# Patient Record
Sex: FEMALE | Race: BLACK OR AFRICAN AMERICAN | Employment: UNEMPLOYED | URBAN - METROPOLITAN AREA
[De-identification: names, ages, dates, MRNs, and addresses within clinical notes are randomized per-mention and may not be internally consistent; named-entity substitution may affect disease eponyms.]

---

## 2017-01-01 ENCOUNTER — APPOINTMENT (OUTPATIENT)
Dept: MRI IMAGING | Age: 42
DRG: 974 | End: 2017-01-01
Attending: INTERNAL MEDICINE
Payer: COMMERCIAL

## 2017-01-01 ENCOUNTER — HOSPICE ADMISSION (OUTPATIENT)
Dept: HOSPICE | Facility: HOSPICE | Age: 42
End: 2017-01-01
Payer: COMMERCIAL

## 2017-01-01 ENCOUNTER — APPOINTMENT (OUTPATIENT)
Dept: GENERAL RADIOLOGY | Age: 42
DRG: 974 | End: 2017-01-01
Attending: INTERNAL MEDICINE
Payer: COMMERCIAL

## 2017-01-01 ENCOUNTER — HOME CARE VISIT (OUTPATIENT)
Dept: HOSPICE | Facility: HOSPICE | Age: 42
End: 2017-01-01
Payer: COMMERCIAL

## 2017-01-01 ENCOUNTER — APPOINTMENT (OUTPATIENT)
Dept: INTERVENTIONAL RADIOLOGY/VASCULAR | Age: 42
DRG: 974 | End: 2017-01-01
Attending: HOSPITALIST
Payer: COMMERCIAL

## 2017-01-01 ENCOUNTER — APPOINTMENT (OUTPATIENT)
Dept: GENERAL RADIOLOGY | Age: 42
DRG: 974 | End: 2017-01-01
Attending: PSYCHIATRY & NEUROLOGY
Payer: COMMERCIAL

## 2017-01-01 ENCOUNTER — HOSPITAL ENCOUNTER (INPATIENT)
Age: 42
LOS: 4 days | DRG: 974 | End: 2017-01-18
Attending: INTERNAL MEDICINE | Admitting: FAMILY MEDICINE
Payer: OTHER MISCELLANEOUS

## 2017-01-01 VITALS
HEART RATE: 89 BPM | TEMPERATURE: 97.4 F | OXYGEN SATURATION: 99 % | RESPIRATION RATE: 30 BRPM | SYSTOLIC BLOOD PRESSURE: 90 MMHG | DIASTOLIC BLOOD PRESSURE: 61 MMHG

## 2017-01-01 PROCEDURE — 0651 HSPC ROUTINE HOME CARE

## 2017-01-01 PROCEDURE — 74011000258 HC RX REV CODE- 258: Performed by: FAMILY MEDICINE

## 2017-01-01 PROCEDURE — 0656 HSPC GENERAL INPATIENT

## 2017-01-01 PROCEDURE — 74011250636 HC RX REV CODE- 250/636: Performed by: FAMILY MEDICINE

## 2017-01-01 PROCEDURE — 71010 XR CHEST PORT: CPT

## 2017-01-01 PROCEDURE — G0299 HHS/HOSPICE OF RN EA 15 MIN: HCPCS

## 2017-01-01 PROCEDURE — 70553 MRI BRAIN STEM W/O & W/DYE: CPT

## 2017-01-01 PROCEDURE — 77003 FLUOROGUIDE FOR SPINE INJECT: CPT

## 2017-01-01 PROCEDURE — 3336500001 HSPC ELECTION

## 2017-01-01 PROCEDURE — 74011000250 HC RX REV CODE- 250: Performed by: FAMILY MEDICINE

## 2017-01-01 PROCEDURE — 74011250637 HC RX REV CODE- 250/637: Performed by: FAMILY MEDICINE

## 2017-01-01 RX ORDER — LORAZEPAM 2 MG/ML
1 INJECTION INTRAMUSCULAR
Status: DISCONTINUED | OUTPATIENT
Start: 2017-01-01 | End: 2017-01-19 | Stop reason: HOSPADM

## 2017-01-01 RX ORDER — GLYCOPYRROLATE 0.2 MG/ML
0.2 INJECTION INTRAMUSCULAR; INTRAVENOUS
Status: DISCONTINUED | OUTPATIENT
Start: 2017-01-01 | End: 2017-01-19 | Stop reason: HOSPADM

## 2017-01-01 RX ORDER — HYDROMORPHONE HCL IN 0.9% NACL 15 MG/30ML
PATIENT CONTROLLED ANALGESIA VIAL INTRAVENOUS CONTINUOUS
Status: DISCONTINUED | OUTPATIENT
Start: 2017-01-01 | End: 2017-01-19 | Stop reason: HOSPADM

## 2017-01-01 RX ORDER — HYDROMORPHONE HYDROCHLORIDE 1 MG/ML
1 INJECTION, SOLUTION INTRAMUSCULAR; INTRAVENOUS; SUBCUTANEOUS
Status: DISCONTINUED | OUTPATIENT
Start: 2017-01-01 | End: 2017-01-19 | Stop reason: HOSPADM

## 2017-01-01 RX ORDER — ONDANSETRON 2 MG/ML
4 INJECTION INTRAMUSCULAR; INTRAVENOUS
Status: DISCONTINUED | OUTPATIENT
Start: 2017-01-01 | End: 2017-01-19 | Stop reason: HOSPADM

## 2017-01-01 RX ORDER — KETOROLAC TROMETHAMINE 30 MG/ML
30 INJECTION, SOLUTION INTRAMUSCULAR; INTRAVENOUS
Status: ACTIVE | OUTPATIENT
Start: 2017-01-01 | End: 2017-01-01

## 2017-01-01 RX ORDER — SODIUM CHLORIDE 0.9 % (FLUSH) 0.9 %
5 SYRINGE (ML) INJECTION AS NEEDED
Status: DISCONTINUED | OUTPATIENT
Start: 2017-01-01 | End: 2017-01-19 | Stop reason: HOSPADM

## 2017-01-01 RX ORDER — CHLORHEXIDINE GLUCONATE 1.2 MG/ML
15 RINSE ORAL 2 TIMES DAILY
Status: DISCONTINUED | OUTPATIENT
Start: 2017-01-01 | End: 2017-01-19 | Stop reason: HOSPADM

## 2017-01-01 RX ORDER — SCOLOPAMINE TRANSDERMAL SYSTEM 1 MG/1
1.5 PATCH, EXTENDED RELEASE TRANSDERMAL
Status: DISCONTINUED | OUTPATIENT
Start: 2017-01-01 | End: 2017-01-19 | Stop reason: HOSPADM

## 2017-01-01 RX ORDER — LORAZEPAM 2 MG/ML
2 INJECTION INTRAMUSCULAR EVERY 4 HOURS
Status: DISCONTINUED | OUTPATIENT
Start: 2017-01-01 | End: 2017-01-19 | Stop reason: HOSPADM

## 2017-01-01 RX ORDER — FENTANYL CITRATE-0.9 % NACL/PF 900MCG/30
PATIENT CONTROLLED ANALGESIA VIAL INJECTION CONTINUOUS
Status: DISCONTINUED | OUTPATIENT
Start: 2017-01-01 | End: 2017-01-01

## 2017-01-01 RX ORDER — FACIAL-BODY WIPES
10 EACH TOPICAL DAILY PRN
Status: DISCONTINUED | OUTPATIENT
Start: 2017-01-01 | End: 2017-01-19 | Stop reason: HOSPADM

## 2017-01-01 RX ADMIN — Medication: at 09:00

## 2017-01-01 RX ADMIN — Medication: at 10:10

## 2017-01-01 RX ADMIN — LORAZEPAM 2 MG: 2 INJECTION INTRAMUSCULAR; INTRAVENOUS at 05:41

## 2017-01-01 RX ADMIN — Medication: at 02:01

## 2017-01-01 RX ADMIN — LORAZEPAM 1 MG: 2 INJECTION INTRAMUSCULAR; INTRAVENOUS at 08:17

## 2017-01-01 RX ADMIN — LORAZEPAM 2 MG: 2 INJECTION INTRAMUSCULAR; INTRAVENOUS at 04:44

## 2017-01-01 RX ADMIN — LORAZEPAM 1 MG: 2 INJECTION INTRAMUSCULAR; INTRAVENOUS at 08:59

## 2017-01-01 RX ADMIN — LEVETIRACETAM 2000 MG: 100 INJECTION, SOLUTION INTRAVENOUS at 21:42

## 2017-01-01 RX ADMIN — Medication 5 ML: at 04:44

## 2017-01-01 RX ADMIN — GLYCOPYRROLATE 0.2 MG: 0.2 INJECTION INTRAMUSCULAR; INTRAVENOUS at 22:37

## 2017-01-01 RX ADMIN — LORAZEPAM 1 MG: 2 INJECTION INTRAMUSCULAR; INTRAVENOUS at 10:21

## 2017-01-01 RX ADMIN — CHLORHEXIDINE GLUCONATE 15 ML: 1.2 RINSE ORAL at 21:42

## 2017-01-01 RX ADMIN — LORAZEPAM 1 MG: 2 INJECTION INTRAMUSCULAR; INTRAVENOUS at 11:25

## 2017-01-01 RX ADMIN — CHLORHEXIDINE GLUCONATE 15 ML: 1.2 RINSE ORAL at 18:21

## 2017-01-01 RX ADMIN — LORAZEPAM 2 MG: 2 INJECTION INTRAMUSCULAR; INTRAVENOUS at 15:31

## 2017-01-01 RX ADMIN — LORAZEPAM 2 MG: 2 INJECTION INTRAMUSCULAR; INTRAVENOUS at 13:40

## 2017-01-01 RX ADMIN — LORAZEPAM 2 MG: 2 INJECTION INTRAMUSCULAR; INTRAVENOUS at 01:11

## 2017-01-01 RX ADMIN — LORAZEPAM 2 MG: 2 INJECTION INTRAMUSCULAR; INTRAVENOUS at 16:18

## 2017-01-01 RX ADMIN — CHLORHEXIDINE GLUCONATE 15 ML: 1.2 RINSE ORAL at 19:54

## 2017-01-01 RX ADMIN — LEVETIRACETAM 2000 MG: 100 INJECTION, SOLUTION INTRAVENOUS at 07:47

## 2017-01-01 RX ADMIN — LORAZEPAM 2 MG: 2 INJECTION INTRAMUSCULAR; INTRAVENOUS at 09:03

## 2017-01-01 RX ADMIN — LORAZEPAM 2 MG: 2 INJECTION INTRAMUSCULAR; INTRAVENOUS at 07:25

## 2017-01-01 RX ADMIN — Medication: at 15:38

## 2017-01-01 RX ADMIN — Medication 5 ML: at 21:48

## 2017-01-01 RX ADMIN — LORAZEPAM 2 MG: 2 INJECTION INTRAMUSCULAR; INTRAVENOUS at 21:40

## 2017-01-01 RX ADMIN — CHLORHEXIDINE GLUCONATE 15 ML: 1.2 RINSE ORAL at 09:03

## 2017-01-01 RX ADMIN — LEVETIRACETAM 2000 MG: 100 INJECTION, SOLUTION INTRAVENOUS at 22:00

## 2017-01-01 RX ADMIN — CHLORHEXIDINE GLUCONATE 15 ML: 1.2 RINSE ORAL at 08:43

## 2017-01-01 RX ADMIN — Medication: at 17:33

## 2017-01-01 RX ADMIN — LEVETIRACETAM 2000 MG: 100 INJECTION, SOLUTION INTRAVENOUS at 21:39

## 2017-01-01 RX ADMIN — CHLORHEXIDINE GLUCONATE 15 ML: 1.2 RINSE ORAL at 09:04

## 2017-01-01 RX ADMIN — Medication: at 00:04

## 2017-01-01 RX ADMIN — LORAZEPAM 2 MG: 2 INJECTION INTRAMUSCULAR; INTRAVENOUS at 02:00

## 2017-01-01 RX ADMIN — Medication 5 ML: at 12:16

## 2017-01-01 RX ADMIN — Medication: at 15:45

## 2017-01-01 RX ADMIN — Medication: at 08:08

## 2017-01-01 RX ADMIN — Medication: at 12:02

## 2017-01-01 RX ADMIN — LORAZEPAM 2 MG: 2 INJECTION INTRAMUSCULAR; INTRAVENOUS at 12:16

## 2017-01-01 RX ADMIN — LORAZEPAM 2 MG: 2 INJECTION INTRAMUSCULAR; INTRAVENOUS at 22:15

## 2017-01-01 RX ADMIN — CHLORHEXIDINE GLUCONATE 15 ML: 1.2 RINSE ORAL at 22:14

## 2017-01-01 RX ADMIN — Medication: at 03:15

## 2017-01-01 RX ADMIN — Medication 5 ML: at 01:00

## 2017-01-01 RX ADMIN — LEVETIRACETAM 2000 MG: 100 INJECTION, SOLUTION INTRAVENOUS at 08:40

## 2017-01-01 RX ADMIN — LORAZEPAM 2 MG: 2 INJECTION INTRAMUSCULAR; INTRAVENOUS at 01:00

## 2017-01-01 RX ADMIN — LEVETIRACETAM 2000 MG: 100 INJECTION, SOLUTION INTRAVENOUS at 22:30

## 2017-01-01 RX ADMIN — LEVETIRACETAM 2000 MG: 100 INJECTION, SOLUTION INTRAVENOUS at 09:10

## 2017-01-01 RX ADMIN — LORAZEPAM 2 MG: 2 INJECTION INTRAMUSCULAR; INTRAVENOUS at 11:28

## 2017-01-01 RX ADMIN — LEVETIRACETAM 2000 MG: 100 INJECTION, SOLUTION INTRAVENOUS at 08:44

## 2017-01-01 RX ADMIN — LORAZEPAM 2 MG: 2 INJECTION INTRAMUSCULAR; INTRAVENOUS at 07:19

## 2017-01-01 RX ADMIN — LORAZEPAM 2 MG: 2 INJECTION INTRAMUSCULAR; INTRAVENOUS at 17:06

## 2017-01-01 RX ADMIN — Medication: at 22:35

## 2017-01-01 RX ADMIN — LORAZEPAM 2 MG: 2 INJECTION INTRAMUSCULAR; INTRAVENOUS at 11:17

## 2017-01-01 RX ADMIN — CHLORHEXIDINE GLUCONATE 15 ML: 1.2 RINSE ORAL at 08:41

## 2017-01-01 RX ADMIN — LORAZEPAM 2 MG: 2 INJECTION INTRAMUSCULAR; INTRAVENOUS at 19:54

## 2017-01-04 PROBLEM — R90.89 ABNORMAL BRAIN MRI: Status: ACTIVE | Noted: 2017-01-01

## 2017-01-07 PROBLEM — B20 AIDS (ACQUIRED IMMUNE DEFICIENCY SYNDROME) (HCC): Status: ACTIVE | Noted: 2017-01-01

## 2017-01-10 PROBLEM — R40.1 OBTUNDED: Status: ACTIVE | Noted: 2017-01-01

## 2017-01-11 PROBLEM — R40.20 COMA (HCC): Status: ACTIVE | Noted: 2017-01-01

## 2017-01-14 PROBLEM — B20 AIDS DUE TO HIV-I (HCC): Status: ACTIVE | Noted: 2017-01-01

## 2017-01-15 NOTE — HOSPICE
[facility] Worker Note:    LCSW attempted to meet with pts family who were at bedside to provide support and complete initial psychosocial assessment. Pts family was sleeping and Hospital RN stated pt was more comfortable now, as she was having seizures earlier. LCSW did not want to wake family due to appearing to be exhausted. LCSW encouraged hospital staff to call with any support needs. Hospital staff stated pts family appeared to be doing ok at this time. Hospice SW will follow-up with pts family to complete assessment.      Jenae [id number]   [phone]

## 2017-01-15 NOTE — PROGRESS NOTES
01/14/17 2134   Vital Signs   Temp 97.7 °F (36.5 °C)   Temp Source Axillary   Pulse (Heart Rate) 94   Heart Rate Source Monitor   Resp Rate 16   O2 Sat (%) 96 %   Level of Consciousness (!) Responds to Pain   /65   MAP (Calculated) 77   BP 1 Method Automatic   BP 1 Location Left arm   BP Patient Position At rest   MEWS Score 4   Pain 1   Pain Scale 1 Adult Nonverbal Pain Scale   Adult Nonverbal Pain Scale   Face 0   Activity (Movement) 0   Guarding 0   Physiology (Vital Signs) 0   Respiratory 0   Total Score 0   Oxygen Therapy   O2 Device Nasal cannula   O2 Flow Rate (L/min) 4 l/min   Patient Observation   Repositioned Head of bed elevated (degrees)   Patient Turned Turn on left side   Observations in bed; family at bedside   Hourly Rounds Yes   Mews 4. Pt on Hospice care.  Will continue to monitor

## 2017-01-15 NOTE — HOSPICE
[facility]  [phone]    GIP Daily Nursing Note   Patient Name: Guille Stephenson  YOB: 1975  Age: 42 y.o. Date of Visit: 01/15/17  Facility of Care: 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 Seizures, intractable-seizing when I am in the room  2. SOB  3. Agitation  4. Respiratory failure  5. Secretions    ASSESSMENT & PLAN   Must update Plan of Care including visit frequencies for IDT members  1. Seizures, intractable: Witnessed seizure activity by Dr. Luis Alberto Corral early today. Scheduled Ativan 2mg  IV every 4 hours added to medication regimen. Continue IV Keppra. 2. SOB: Dr. Luis Alberto Corral changed to Dilaudid PCA 1 mg/hour basal with 0.5 mg bolus every 10 minutes. 3. Agitation- stabilized since receiving scheduled Ativan  4. Respiratory failure- respirations shallow, agonal  5. Secretions- Robinul prn, Scopolomine patch   Spiritual Interventions:     Psych/ Social/ Emotional Interventions:  Family not present at time of assessment    Care Coordination Needs: Discussed with primary RN, Dr. Luis Alberto Corral, and IDT    Care plan and New Orders discussed / approved with Luis Alberto Corral MD.    Description History and Chart Review   If this is initial GIP note must document RN assessment/MD communication in previous setting. Specifically document nursing/medication needs in last 24 hours to support GIP care  Narrative History of last 24 hours that demonstrates care cannot be provided in another setting:  Patient is a 41 YO  female who  was hospitalized with complications related to her AIDs diagnosis to include pneumocystis pneumonia, brain lesions which may be related lymphoma vs infection causing seizures, CMV viremia, anemia, viral load >800,000.  Patient continued to decline and ultimately family shifted focus toward comfort and was extubated. Patient was admitted to inpatient hospice, [facility] level of care on 1/14/2016. Patient has had seizure activity despite being on IV Kepra. She remains unresponsive, breathing more labored, agonal. No seizure activity noted at this time    What has been done to control the patient's symptoms in the last 24 hours? Dr. Karon Chang discontinued Fentynal PCA due to concern of dyspnea and changed to Dilaudid PCA. Ativan scheduled every 4 hours. Robinul scheduled    Does the patient currently require IV medications? Yes  Does the patient currently require scheduled medications? yes  Does the patient currently require a PCA? Yes    List number of doses of PRN medications in last 24 hours:  Medication 1:  Number of doses:    Medication 2:   Number of doses:    Medication 3:   Number of doses:    Supporting documentation for GIP need for pain control:  [x] Frequent evaluation by a doctor, nurse practitioner, nurse   [x] Frequent medication adjustment    [x] IVs that cannot be administered at home   [x] Aggressive pain management   [x] Complicated technical delivery of medications              Supporting documentation for GIP need for symptom control:  [x]  Sudden decline necessitating intensive nursing intervention  []  Uncontrolled / intractable nausea or vomiting   []  Pathological fractures  []  Advanced open wounds requiring frequent skilled care  [] Unmanageable respiratory distress  [] New or worsening delirium   [] Delirium with behavior issues: Is 24 hour caregiver present due to safety concerns with agitation? (yes/no)  [x] Imminent death  with skilled nursing needs documented above    DISCHARGE PLANNING   Daily discharge planning required for GIP  1. Discharge Plan: Patient will most likely die in the hospital. Plan would be nursing home admission with hospice  2. Patient/Family teaching: No family present  3.  Response to patient/family teaching:     ASSESSMENT    KARNOFSKY: 10    Prognosis estimated based on 01/15/17 clinical assessment is:   [] Few to Many Hours  [] Hours to Days   [] Few to Many Days   [] Days to Weeks   [] Few to Many Weeks   [] Weeks to Months   [] Few to Many Months    Quality Measure: Patient self-reports:  [] Yes    [] No    ESAS:   Time of Assessment: 1800  Pain (1-10): 4  Fatigue (1-10): 10  Shortness of breath (1-10):5  Nausea (1-10): 0  Appetite (1-10):    Anxiety: (1-10):   Depression: (1-10):   Well-being: (1-10):   Constipation: _ Yes  _ No  LAST BM:     CLINICAL INFORMATION   Patient Vitals for the past 12 hrs:   Temp Pulse Resp BP SpO2   01/15/17 0904 98.3 °F (36.8 °C) (!) 111 24 104/67 90 %       Currently this patient has:  [] Supplemental O2   [x] IV    [] PICC      [] PORT   [] NG Tube    [] PEG Tube   [] Ostomy     [x] Alfaro draining  ambur urine  [] Other:     SIGNS/PHYSICAL FINDINGS     Skin (including wound):  [x] Warm, dry, supple, intact and color normal for race  [] Warm   [] Dry   [] Cool     [] Clammy       [] Diaphoretic    Turgor   [] Normal   [x] Decreased  Color:   [] Pink   [] Pale   [] Cyanotic   [] Erythema   [] Jaundice   [x] Normal for Race  []  Wounds:    Neuro:  [x] Lethargy  [] Restlessness / agitation  [] Confusion / delirium  [] Hallucinations  [] Responds to maximal stimulation  [x] Unresponsive  [x] Seizures     Cardiac:  [] Dyspnea on Exertion  [] JVD  [] Murmur  [] Palpitations  [] Hypotension  [] Hypertension  [x] Tachycardia  [] Bradycardia  [] Irregular HR  [] Pulses Decreased  [] Pulses Absent  [] Edema:       (Location, Grade and Pitting)  [] Mottling:      (Location)    Respiratory:  Breath sounds:    [x] Diminished   [] Wheeze   [x] Rhonchi   [] Rales   [] Even and unlabored  [x] Labored: shallow, agonal'  [] Cough   [] Non Productive   [] Productive    [] Description:           [] Deep suctioned   [] O2 at ___ LPM  [] High flow oxygen greater than 10 LPM  [] Bi-Pap    GI  [] Abdomen (describe)   [] Ascites  [] Nausea  [] Vomiting  [] Incontinent of bowels  [x] Bowel sounds yes  [] Diarrhea  [] Constipation (see above including last bowel movement)  [] Checked for impaction  [x] Last BM 1/3/17    Nutrition  Diet: NPO  Appetite:   [] Good   [] Fair   [] Poor   [] Tube Feeding       [] Voiding  [] Incontinent   [x] Alfaro    Musculoskeletal  [] Balance/[place] Unsteady   [] Weak   Strength:    [] Normal    [] Limited    [x] Decreasing   Activities:    [] Up as tolerated   [x] Bedridden    [] Specify:    SAFETY  [] 24 hr. Caregiver   [x] Side rails ?     [x] Hospital bed   [] Reviewed Falls & Safety       ALLERGIES AND MEDICATIONS     Allergies: No Known Allergies    Current Facility-Administered Medications   Medication Dose Route Frequency    HYDROmorphone (PF) 15 mg/30 ml (DILAUDID) PCA   IntraVENous CONTINUOUS    LORazepam (ATIVAN) injection 2 mg  2 mg IntraVENous Q4H    ketorolac (TORADOL) injection 30 mg  30 mg IntraVENous Q8H PRN    scopolamine (TRANSDERM-SCOP) 1.5 mg  1.5 mg TransDERmal Q72H PRN    glycopyrrolate (ROBINUL) injection 0.2 mg  0.2 mg IntraVENous Q4H PRN    bisacodyl (DULCOLAX) suppository 10 mg  10 mg Rectal DAILY PRN    levETIRAcetam (KEPPRA) 2,000 mg in 0.9% sodium chloride IVPB  2,000 mg IntraVENous Q12H    ondansetron (ZOFRAN) injection 4 mg  4 mg IntraVENous Q4H PRN    LORazepam (ATIVAN) injection 1 mg  1 mg IntraVENous Q15MIN PRN    saline peripheral flush soln 5 mL  5 mL InterCATHeter PRN    chlorhexidine (PERIDEX) 0.12 % mouthwash 15 mL  15 mL Oral BID          Visit Time In: 5257  Visit Time Out: 1800

## 2017-01-15 NOTE — HOSPICE
[facility]  [phone]    Inpatient Nursing Admission   Patient Name: Katja Farrell  YOB: 1975  Age: 42 y.o. Date of Hospice Admission: (Not on file)  Hospice Attending: Genie Miranda MD  Primary Care Physician: None  Admitting RN: Rodney Garza  :     Level of Care (GIP/Routine/Respite): GIP  Facility of Care: [facility]  Patient Room: 619/01     HOSPICE SUMMARY   ER Visits/ Hospitalizations in past year:   Hospice Diagnosis: AIDS  Onset Date of Hospice Diagnosis: 2009  Co-Morbidities:   Patient Active Problem List   Diagnosis Code    Pneumonia J18.9    Multifocal pneumonia J18.9    Abnormal brain MRI R90.89    AIDS (acquired immune deficiency syndrome) ([address] B20    Coma ([address] R40.20     Diagnoses RELATED to the terminal prognosis:  Encephalopathy with brain lesions with mass effect. , seizures, acute respiratory failure, pneumonia, septic shock  Other Diagnoses:     Rationale for a prognosis of life expectancy of 6 months or less if the disease follows its normal course (Disease Specific History):   Ms. Rylan Friedman is a 42 y.o.  female with diagnosis if HIV/AIDSwho was  admitted to [facility] on 12/27/2016 with  Fever, chills and weight loss for greater than one week. CXR revealed multifocal pneumonia and systolic cardiomyopathy. MRI revealed encephalopathy with brain lesions with mass effect. Patient was referred to Palliative Medicine  compassionately extubated on 1/12/17. Family has requested comfort measures and referred to hospice. The patient's principle diagnosis of AIDS has resulted in bilateral pneuemonia, respiratory failure, encephalopathy, seizures, severe weight loss.   Functionally, the patient's Palliative Performance Scale has declined over a period of months  and is estimated at 10  Objective information that support this patients limited prognosis includes: sepsis, respiratory failure, unresponsive to verbal or pain stimuli . The patient/family chose comfort measures with the support of Hospice. Prognosis estimated based on 01/14/17 clinical assessment is:   [x] Few to Many Hours  [] Hours to Days   [] Few to Many Days   [] Days to Weeks   [] Few to Many Weeks   [] Weeks to Months   [] Few to Many Months    ADVANCE CARE PLANNING (Complete in ACP Flow Sheet)   Code Status: DNR   Durable DNR: N Yes  _ No  Advance Care Planning 12/28/2016   Patient's Healthcare Decision Maker is: Named in scanned ACP document   Primary Decision Maker Name Toyin Alvares   Primary Decision Maker Phone Number [phone]   Primary Decision Maker Relationship to Patient Parent   Confirm Advance Directive Yes, on file        Service: [] Yes  []  No      [] Unknown  Appropriate for Pinning Ceremony:  [] Yes     [] No  Confucianist: Restorationist    ASSESSMENT    Quality Measure: Patient self-reports:  []  Yes    [x] No    SYMPTOMS: Fever 102.6, agitation, restlessness. unresponsive  ESAS:   Time of Assessment: 2000  Pain (1-10): 5   Shortness of breath (1-10): 2  Nausea (1-10): 0  Constipation: []  Yes [] No    FALL RISK:  [] Low   [] Moderate    [] High    [x] Not able to assess    KARNOFSKY: 10    PRESSURE ULCERS   Bob Scale (pressure ulcer risk): low    FAST for all dementia:     Progression to DEPENDENCE WITH ADLs (include time frame): weeks  [x] Dependent for bathing: personal hygiene and grooming   [x] Dependent for dressing: dressing and undressing   [x] Dependent for transferring: movement and mobility   [x] Dependent for toileting: continence-related tasks including control and hygiene   [x] Dependent for eating: preparing food and feeding    CLINICAL INFORMATION   Mid-arm Circumference (MAC):        Wt Readings from Last 3 Encounters:   01/13/17 51.3 kg (113 lb)   01/03/17 52.8 kg (116 lb 6.5 oz)   12/27/16 39 kg (85 lb 15.7 oz)     There is no height or weight on file to calculate BMI.       Currently this patient has:  [] Supplemental O2 [x] Peripheral IV  [] PICC    [] PORT   [x] Alfaro Catheter [] NG Tube   [] PEG Tube [] Ostomy    [] AICD: Has ICD been deactivated? [] Yes [] No:______    Medications 01/12/17 01/13/17 01/14/17         Current Facility-Administered Medications   Medication Dose Route Frequency    ketorolac (TORADOL) injection 30 mg  30 mg IntraVENous Q8H PRN    scopolamine (TRANSDERM-SCOP) 1.5 mg  1.5 mg TransDERmal Q72H PRN    glycopyrrolate (ROBINUL) injection 0.2 mg  0.2 mg IntraVENous Q4H PRN    bisacodyl (DULCOLAX) suppository 10 mg  10 mg Rectal DAILY PRN    levETIRAcetam (KEPPRA) 2,000 mg in 0.9% sodium chloride IVPB  2,000 mg IntraVENous Q12H    ondansetron (ZOFRAN) injection 4 mg  4 mg IntraVENous Q4H PRN    LORazepam (ATIVAN) injection 1 mg  1 mg IntraVENous Q15MIN PRN    fentaNYL (PF)  mcg/30 ml (ADULT)   IntraVENous CONTINUOUS    saline peripheral flush soln 5 mL  5 mL InterCATHeter PRN    chlorhexidine (PERIDEX) 0.12 % mouthwash 15 mL  15 mL Oral BID       LAB VALUES  No results found for this visit on 01/14/17 (from the past 12 hour(s)). No results found for this visit on 01/14/17 (from the past 6 hour(s)). Lab Results   Component Value Date/Time    Protein, total 5.4 01/12/2017 03:06 AM    Albumin 1.7 01/12/2017 03:06 AM       ALLERGIES AND MEDICATIONS     Allergies: No Known Allergies      ASSESSMENT & PLAN     1. Admit to inpatient hospice GIP level of care  2. Comfort measures  3. Continue Keppra IV  And prn Ativan to manage seizures  4. Fentaynal PCA continuous rate 200 mcq/hr  with as needed RN Bolus 50 mcq every 6 minutes for pain management  5. Toradol as needed for fever  6. Robinul IV and Scoplomine topically for secretion control  7. Support to extended family with  and  services    CAREGIVER SUPPORT:  [x] Provided information on End of Life Care   [] Material Provided: Gone From My Sight or Journey's End     1.  FALL INTERVENTIONS PROVIDED (if the fall risk is >10,  the intervention below was provided)  [x] Implement/recommend assistive devices and encouraged their use (use full bed rails, etc)  [] Implemented/recommended resources for alar, system (personal alarm, bed alarm, call bell, etc)  [] Implemented/recommended environmental changes (remove hazards, lower bed, improper lighting, etc.)  [] Implemented/recommended increased supervision/assistance  [] Scheduled PT and/or OT evaluation for mobility/transfer techniques and/or assistive device recommendations    2. Initial Bereavement POC  [x] LOW RISK Indications: normal grief, good support, opens expression. [] MODERATE RISK Indications: grief normal but severe, depression, somatic distress, low support  [] HIGH RISK indications: Hx of mental illness, suicidal ideation, depression, somatic distress, excessive guilt or anger, multiple losses, other life crisis. 3. Oxygen SAFETY and Anticoagulation SAFETY: Only if being transferred to home environment    4. Copies of Election of Benefit and Hospice Consent:  [x] Hospice Folder Provided  [x] See Electronic Health Records for past medical records    5. Spiritual Issues Identified: Patient is Confucianist by gilda and family is appreciative of  visits    6. Psych/ Social/ Emotional Issues Identified: Patient has 4 children, the youngest is 12. They are present at patients bedside and appear to be  Coping well  7. Care Coordination:   Dr. Luis Alberto Corral contacted for medication reconciliation, hospice services and treatment  MEDS: See medication list above  DME: Per hospital/hospice house  Supplies: Per hospital/hospice house   Home: Family is making decision and will inform nursing staff    9.   Hospice Team Orders  [x] Skilled Nurse -  daily (daily/every other day) while admitted to hospital / hospice house  [x] MSW  1 visit and then prn for assessment/evaluation for family support and need for volunteer services  [x]   1 visit and then prn for assessment/evaluation for spiritual support.   services will contact / Clinical Manager for further orders  [] Hospice Aide to be evaluated by  (if patient being transferred to home environment)

## 2017-01-15 NOTE — H&P
[address] [place] [address] [facility]  [phone]    Patient Name: Tushar Quiñonez  YOB: 1975    Date of Provider Hospice Visit: 01/15/17    Level of Care:   [x] General Inpatient (GIP)    [] Routine   [] Respite    Location of Care:  [x] [facility] [place] [] [facility] [] [facility] [] [facility] [] [facility]  [] Home [] Other:      Date of Original Hospice Admission: 17  Hospice AttendinBetty Novoa Diagnosis:HIV/AIDS  Diagnoses RELATED to the terminal prognosis: Respiratory failure, Pneumocystis pneumonia, CMV viremia, recurrent seizure, brain lesions with mass effect, septic shock, s/p extubation  Other Diagnoses:      HOSPICE NARRATIVE COMPOSED BY PHYSICIAN   Rationale for a prognosis of life expectancy of 6 months or less if the disease follows its normal course:    Tushar Quiñonez is a 42y.o. year old who was admitted to [facility]. The patient's principle diagnosis of AIDS/HIV has resulted in CMV viremia, respiratory failure(intubated), pneumocystis pneumonia, sepitc shock and seizures. Functionally, the patient's Palliative Performance Scale has declined over a period of weeks and is estimated at 10. Objective information that support this patients limited prognosis includes: Chart reviewed. Patient admitted with respiratory failure, septic shock. Patient had stopped her antiviral medication probably 6 months prior. Patient had many complications related to her AIDs diagnosis to include pneumocystis pneumonia, brain lesions which may be related lymphoma vs infection causing seizures, CMV viremia, anemia, viral load >800,000. Patient continued to decline and ultimately family shifted focus toward comfort and was extubated. Her mother was assigned as MPOA. The patient/family chose comfort measures with the support of Hospice. HOSPICE DIAGNOSES   Active Symptoms:  1. Seizures, intractable-seizing when I am in the room  2. SOB  3. Agitation  4.  Respiratory failure  5. Secretions     PLAN   1. Patient admitted GIP for many symptomatic issues. 2. SOB and pain-patient has been on fentanyl PCA of 200 mcg/hour. Appears restless and having seizures. Not sure this as effective at this dosing and some of the restlessness may actually be related to the fentanyl. Will change to dilaudid PCA with 1 mg/hour basal and 0.5 mg bolus every 10 minutes. May need to adjust doing throughout the day. 3. Seizures-concern about even issues with status. Has required multiple doses of ativan this morning to break the cycle. Talked with patient's nurse and will place on scheduled ativan 2 mg Iv every 4 hours and continue keppra. Consider Ativan or versed drip if unable to control. 4. Secretions-robinul will be scheduled. 5. Other comfort meds ordered prn    6.  and SW to support family needs  7. Disposition: home if stabilizes. Talked with daughter and son who are at bedside. Patient has 4 children. Mom MPOA but not at bedside    Prognosis estimated based on 01/15/17 clinical assessment is:   [] Few to Many Hours  [x] Few to Many Days    [] Few to Many Weeks    [] Few to Many Months    Communicated plan of care with: Hospice Case Manager;  Hospice IDT; Care Team     GOALS OF CARE     Resuscitation Status: DNR  Durable DNR: [x] Yes [] No    Advance Care Planning 12/28/2016   Patient's Healthcare Decision Maker is: Named in scanned ACP document   Primary Decision Maker Name Reanna Mcneal   Primary Decision Maker Phone Number [phone]   Primary Decision Maker Relationship to Patient Parent   Confirm Advance Directive Yes, on file        HISTORY     History obtained from: chart, nursing staff, children    CHIEF COMPLAINT: shortness of breaht  The patient is:   [] Verbal  [] Nonverbal  [x] Unresponsive    HPI/SUBJECTIVE:  Patient seizing when I am the room with rapid eyes movement       REVIEW OF SYSTEMS     The following systems were: [] reviewed  [x] unable to be reviewed    Positive ROS include:  Constitutional:  Ears/nose/mouth/throat:  Respiratory:  Gastrointestinal:  Musculoskeletal:  Neurologic:  Psychiatric:  Endocrine:     Adult Non-Verbal Pain Assessment Score: 7    Face  [] 0   No particular expression or smile  [] 1   Occasional grimace, tearing, frowning, wrinkled forehead  [x] 2   Frequent grimace, tearing, frowning, wrinkled forehead    Activity (movement)  [] 0   Lying quietly, normal position  [] 1   Seeking attention through movement or slow, cautious movement  [x] 2   Restless, excessive activity and/or withdrawal reflexes    Guarding  [] 0   Lying quietly, no positioning of hands over areas of body  [x] 1   Splinting areas of the body, tense  [] 2   Rigid, stiff    Physiology (vital signs)  [x] 0   Stable vital signs  [] 1   Change in any of the following: SBP > 20mm Hg; HR > 20/minute  [] 2   Change in any of the following: SBP > 30mm Hg; HR > 25/minute    Respiratory  [] 0   Baseline RR/SpO2, compliant with ventilator  [] 1   RR > 10 above baseline, or 5% drop SpO2, mild asynchrony with ventilator  [x] 2   RR > 20 above baseline, or 10% drop SpO2, asynchrony with ventilator     FUNCTIONAL ASSESSMENT     Palliative Performance Scale (PPS):10     PSYCHOSOCIAL/SPIRITUAL ASSESSMENT     Active Problems:    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) (1/14/2017)      Past Medical History   Diagnosis Date    Infectious disease      HIV      No past surgical history on file. Social History   Substance Use Topics    Smoking status: Never Smoker    Smokeless tobacco: Not on file    Alcohol use Yes     No family history on file.    No Known Allergies   Current Facility-Administered Medications   Medication Dose Route Frequency    HYDROmorphone (PF) 15 mg/30 ml (DILAUDID) PCA   IntraVENous CONTINUOUS    ketorolac (TORADOL) injection 30 mg  30 mg IntraVENous Q8H PRN    scopolamine (TRANSDERM-SCOP) 1.5 mg  1.5 mg TransDERmal Q72H PRN    glycopyrrolate (ROBINUL) injection 0.2 mg  0.2 mg IntraVENous Q4H PRN    bisacodyl (DULCOLAX) suppository 10 mg  10 mg Rectal DAILY PRN    levETIRAcetam (KEPPRA) 2,000 mg in 0.9% sodium chloride IVPB  2,000 mg IntraVENous Q12H    ondansetron (ZOFRAN) injection 4 mg  4 mg IntraVENous Q4H PRN    LORazepam (ATIVAN) injection 1 mg  1 mg IntraVENous Q15MIN PRN    saline peripheral flush soln 5 mL  5 mL InterCATHeter PRN    chlorhexidine (PERIDEX) 0.12 % mouthwash 15 mL  15 mL Oral BID        PHYSICAL EXAM     Wt Readings from Last 3 Encounters:   01/13/17 113 lb (51.3 kg)   01/03/17 116 lb 6.5 oz (52.8 kg)   12/27/16 85 lb 15.7 oz (39 kg)       Visit Vitals    /67 (BP 1 Location: Left arm, BP Patient Position: At rest)    Pulse (!) 111    Temp 98.3 °F (36.8 °C)    Resp 24    SpO2 90%       Supplemental O2  [x] Yes  [] NO  Last bowel movement:     Currently this patient has:  [x] Peripheral IV [] PICC  [] PORT [] ICD    [x] Alfaro Catheter [] NG Tube   [] PEG Tube    [] Rectal Tube [] Drain  [] Other:     Constitutional: ill appearing with active seizure  Eyes: rapid eye movement to the left  ENMT: foaming at the mouth  Cardiovascular: tachycardia  Respiratory: moderate respiratory distress with lots of secretions  Gastrointestinal: soft/nt  Musculoskeletal:muscle wasting  Skin: warm, dry  Neurologic: seizing, unresponsive      Pertinent Lab and or Imaging Tests:  Lab Results   Component Value Date/Time    Sodium 135 01/12/2017 03:06 AM    Potassium 3.9 01/12/2017 03:06 AM    Chloride 102 01/12/2017 03:06 AM    CO2 25 01/12/2017 03:06 AM    Anion gap 8 01/12/2017 03:06 AM    Glucose 120 01/12/2017 03:06 AM    BUN 13 01/12/2017 03:06 AM    Creatinine 0.40 01/12/2017 03:06 AM    BUN/Creatinine ratio 33 01/12/2017 03:06 AM    GFR est AA >60 01/12/2017 03:06 AM    GFR est non-AA >60 01/12/2017 03:06 AM    Calcium 7.8 01/12/2017 03:06 AM     Lab Results   Component Value Date/Time    Protein, total 5.4 01/12/2017 03:06 AM    Albumin 1.7 01/12/2017 03:06 AM           Total time: 79  Counseling / coordination time:   > 50% counseling / coordination?:     This patient meets Hospice General Inpatient (GIP) Level of Care.     The precipitating event that resulted in the need for GIP was: shortness of breath    The interventions tried that have been unsuccessful at controlling symptoms include: to unstable to manage at home    Supporting documentation for GIP need for pain control:  [x] Frequent evaluation by a doctor, nurse practitioner, nurse   [x] Frequent medication adjustment    [x] IVs that cannot be administered at home   [] Aggressive pain management   [] Complicated technical delivery of medications              Supporting documentation for GIP need for symptom control:  []  Sudden decline necessitating intensive nursing intervention  []  Uncontrolled / intractable nausea or vomiting   []  Pathological fractures  []  Advanced open wounds requiring frequent skilled care  [x] Unmanageable respiratory distress  [] New or worsening delirium   [] Delirium with behavior issues  [] Imminent death  with skilled nursing needs documented above   Intractable seizures

## 2017-01-15 NOTE — PROGRESS NOTES
Bedside shift change report given to Shelton Guallpa RN (oncoming nurse) by Volodymyr Boogie RN (offgoing nurse). Report included the following information SBAR, Kardex, MAR and Recent Results. 46  Patient's family member called out to nurses station needing assistance. RN went to room immediately and observed patient blinking vigorously, twitchy facial movements, drooling and what appeared to be tonic clonic movement. This lasted for about 2 min. Gave 0900 IVPB Keppra now. Turned patient's head to the side and suctioned drool. Will continue to monitor.

## 2017-01-16 NOTE — HOSPICE
[facility]  [phone]    Social Work Admission Note  Patient Name: Jorje Ramos  YOB: 1975  Age: 42 y.o. 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 Another daughter Chyna Toney (24) was not present. Patient was somnolent. Patient is a 42year old AAF with a hospice diagnosis of AIDS/HIV. Patient is  and has the 4 children mentioned above. Patient was diagnosed with AIDS in . Patient lives with her , and Fredrick Paredes and Reinaldo Evans. Reinaldo Evans is home schooled. The children appeared calm. They describe their mother as strong, independent, stubborn, and very private. The children noted their mother encouraged them to strive for the best.    The family is very supportive of one another. Blue Guevara and Fredrick Paredes appear to be realistic, Reinaldo Evans was fairly quiet. . MSW provide support for children, they appear to be coping adaptively considering the young age of their mother. MSW shared [address] [place] [address] information. Per Blue Guevara, sister Kris Quesada, is having a hard time coping with patients EOLt in part because her father  of AIDS in . MSW left [facility] information for her, including services for the other children. Children are moderate risk for bereavement. Son asked for [facility] resources. MSW shared [facility] information with oldest son Blue Guevara. MPOA is patients mother Aubrey Simons and her sister Thomasenia Castleman. No FH arrangements have been made thus far.  Patient is [facility].     ADVANCE CARE PLANNING    Code Status: DNR  Durable DNR: x_ Yes  _ No  Advance Care Planning 2017   Patient's Healthcare Decision Maker is: Named in scanned ACP document   Primary Decision Maker Name Jocelyne Paris   Primary Decision Maker Phone Number [phone]   Primary Decision Maker Relationship to Patient Parent   Confirm Advance Directive Yes, not on file       Relationship Status:  []  Single     [x]        []      []  Domestic Partner     []  /  []  Common Law  []    []  Unknown    If in a relationship, name of partner/spouse:  Duration of relationship:    Jew: [address] [place] [address] Dr: UNDECIDED  Resources Provided: YES    Social Work Initial Assessment     Gender:  female    Race/Ethnicity: (asher all that apply)  []  [place] [facility] or [place] Native  []    [x]  Black or [place] American  []   or   []  [address] [facility] or [facility]  []  Karen Webster  []  Unknown      Service:    []  Yes   [x]  No       []  Unknown  Appropriate for Pinning Ceremony:   []  Yes      []  No  Is patient using VA benefits?    []  Yes      []  No     Primary Language: ENGLISH  []   Needed  []   utilized during visit    Ability to express thoughts/needs/feelings  []  Expressed thoughts/feelings/needs without difficulty  []  Requires extra time and cuing  []  Speech limited single words  []  Uses only gestures (eye, blinking eye or head movement/pointing)  []  Unable to express thoughts/feelings/needs (speech unintelligible or inappropriate)  [x]  Unresponsive, WAS NOT RESPONSIVE   Notes:      Mental Status:  []  Alert-oriented to:     []  Person     []  Place     []  Time  []  Comatose-responds to:    []   Verbal stimuli    []  Tactile stimuli    []  Painful stimuli  []  Forgetful  []  Disoriented/Confused  []  Lethargic  []  Agitated  [x]  Other (specify):  WAS NOT RESPONSIVE   Notes:      Patients description of Illness/Current Health Status:    [x]  Patient unable to discuss  []  Patient unwilling to discuss  []  (Specify)        Knowledge/Understanding of Disease Process  Patient:    []  Demonstrates knowledge/understanding of disease process   []  Demonstrates knowledge/understanding of treatment plan   []  Demonstrates knowledge/understanding of prognosis   []  Demonstrates acceptance of prognosis   []  Demonstrates knowledge/understanding of resuscitation status   [x]  Other (specify) WAS NOT RESPONSIVE   Caregiver: WAS NOT PRESENT   []  Demonstrates knowledge/understanding of disease process   []  Demonstrates knowledge/understanding of treatment plan   []  Demonstrates knowledge/understanding of prognosis   []  Demonstrates acceptance of prognosis   []  Demonstrates knowledge/understanding of resuscitation status   []  Other (specify)  Notes:      Patients living arrangement/care setting:  Use the PRIOR COLUMN when the PATIENTS current health status necessitated a change in his/her primary residence. Prior Current Response              [x]             []    Patients own home/residence              []             []    Home of family member/friend              []             []    Boarding home              []             []    Assisted living facility/[facility] center              []             []    Hospital/Acute care facility              []             []    Skilled nursing facility              []             []    Long term care facility/Nursing home              []             [x]    Hospice in Patient      Primary Caregiver:  []  No Primary Caregiver   name of Primary Caregiver:DERRICK BURTON  Relationship or Primary Caregiver:    []  Spouse/Significant other       []  Natural Child        []  Step child       []  Sibling   [x]  Parent   []  Friend/Neighbor   []  Community/Yazidi Volunteer   []  Paid help   []  Other (specify):___________  Notes:       Family members/Significant others:  Veronica Galeana  Relationship:MOTHER  Phone [phone]  Actively involved in care?   [x]  Yes  []  No    Name:  Relationship:  Phone Number:  Actively involved in care?  []  Yes  []  No    Name:  Relationship:  Phone Number:  Actively involved in care?   []  Yes  []  No    Social support systems: (select ONE best description)  [x]  Excellent social support system which includes three or more family members or friends  []  Good social support system which includes two or less members or friends  []  [address] support which includes one family member or friend  []  Poor social support; no family members or friends; basically ALONE  Notes:      Emotional Status: (asher all that apply)    Patient Caregiver Response                 [x]                [x]    Mood/Affect stable and appropriate                   []                []    Angry                 []                []    Anxious                 []                []    Apprehensive                 []                []    Avoidant                 []                []    Clinging                 []                []    Depressed                 []                []    Distraught                 []                []    Elated                 []                []    Euphoric                 []                []    Fearful                 []                []    Flat Affect                 []                []    Helpless                 []                []    Hostile                 []                []    Impulsive                 []                []    Irritable                 []                []    Labile                 []                []    Manic                 []                []    Restlessness                 []                []    Sad                 []                []    Suspicious                 []                []    Tearful                 []                []    Withdrawn     Notes:     Coping Skills (strengths/weakness):    Patient: Coping Skills (strength/weakness): WAS NOT RESPONSIVE    Family/caregiver (strength/weakness): CHILDREN APPEARED CALM CONSIDERING MOTHER'S NEAR EOL AND UNTIMELY DX/ YOUNG AGE, 4 CHILDREN, 2 DEPENDENT      [place] of care (asher all that apply):     []  No burden evident   []  Family must administer medications   []  Illness causing financial strain   []  Family/Support feels overwhelmed   []  Family/Support sleep disturbed with patients care   []  Patients care causes extra physical stress  of death  [x]  Illness causes changes in family lifestyle  [x]  Illness impacting family/support employment  [x]  Family experiencing increased time demands  []  Patients behavior endangers family  []  Denial of patients illness  []  Concern over outcome of illness/fear  []  Patients behavior embarrassing to family   Notes:      Risk Factors: (asher all that apply):    [x]  No burden evident   []  Alcohol abuse  []  Financial resources inadequate to meet basic needs (food/house/etc)  []  Financial resources inadequate to meet health care needs (supplies/equipment/medications)  []  Food/nutrition resources inadequate  []  Home environment unsafe/inadequate for home care  []  Homicidal risk  []  Lives alone or without concerned relatives  []  Multiple medications/complex schedule  []  Physical limitations increase likelihood of falls  []  Plan of care/treatments complicated  []  Substance use/abuse  []  Suicidal risk  []  Visual impairment threatens safety/ability to perform self-care  []  Other (specify):     Abuse/Neglect (actual/potential risks):  [x]  No signs of abuse/neglect  []  History of abuse/neglect                 []  [id number]          []  Sexual  []  History of domestic violence  []  Lacks adequate physical care  []  Lacks emotional nurturing/support  []  Lacks appropriate stimulation/cognitive experiences  []  Left alone inappropriately  []  Lacks necessary supervision  []  Inadequate or delayed medical care  []  Unsafe environment (i.e guns/drug use/history of violence in the home/etc.)  []  Bruising or other physical signs of injury present  []  Other (specify):  Notes:   []  Refer to child/adult protective services      Current Sources of Stress (in Addition to Current Illness):   []  None reported  []  Bills/Debt    []  Career/Job change    []   (short term)    []   (long term)    []  Death of a child (recent)    []  Death of a parent (recent)   []  Death of a spouse (recent)   []  Employment status changed   []  Family discord    []  Financial loss/Inadequate inther (specify):come  [x]  Job loss OF PT  []  Legal issues unresolved  []  Lifestyle change  []  Marital discord  []  Marriage within the last year  []  Paperwork (insurance/legal/etc) overwhelming  []  Separation/Divorce  []  Other (specify):  Notes:      Current [employer] Being Utilized     1. NONE NOTED        Interventions/Plan of Care     1. Assess social and emotional factors related to coping with end of life issues  2. Community resource planning/referral   3. Relocation to different care setting if/when symptoms stabilize  4. Discharge Planning     1. WILL ADDRESS AS NEEDED.     Hospice Bereavement Assessment     1/14/2017    Bereaved Name: Jayshree [facility] PTS MOTHER/ PTS 25 Y/O DTR IS LAWRENCE, FAMILY IDENTIFY HER AS THE PRIMARY BEREAVED, MSW HAS NOT MET HER   Address: UNKNOWN  Phone: [phone]  Bereaved Date of Birth  Bereaved Gender:  [x]  Female  []  Male  Date Assessment Completed: 01/16/17    Relationship to the Patient:  []  Spouse/Significant other    [x]  Parent  []  Natural child      []  Step child   []  friend/Neighbor  []  Sibling  []  Other (specify):     Primary Caregiver  [x]  Yes    []  No     Social Support Systems  [x]  Excellent social support system which includes three or more willing family members or friends  []  Good social support system which includes two or less willing family members or friends  []  [address] support which includes one willing family member or friend  []  Poor social support; no willing family members or friends; basically ALONE     Frequency of Contact/Communication with Family and Friends  [x]  Daily  []  Three or more times a week  []  One to two times a week  []  Two to three times per month  []  At least monthly  []  Less often than monthly     Community Support Groups/Counseling Services Currently Used:   [x]  None  []  Bereavement support group   Specify support group:   []  Behavioral Support group   Specify support group:   []  Cancer support group    Specify support group:   []  Mental health support/counseling  Specify support group:   []  Other (specify)     Sources of Stress/Grief in Addition to Patients Current illness or Death:    []  None reported  []  Bills/Debt    []  Career/Job change     []   (short term)  []   (long term)     []  Death of child      []  Death of parent    [x]  Death of spouse  LAWRENCE Tatum'S FATHER,  in  from aids  []  Employment status changed     []  Family discord     []  Financial      []  Financial loss/Inadequate income  []  Job loss  []  Lifestyle change  []  Marital discord  []  Marriage within the last year  []  Separation/Divorce.   []  Legal issues unresolved  []  Paperwork (insurance/legal/etc.) overwhelming  []  Personal illness/injury  [x]  Other (specify):NEHA WILL HAVE LOST BOTH PARENTS FORM AIDES  Stress Level Reported   [] 1      [] 2      [] 3      [x] 4      [] 5     [] 6      [] 7      [] 8      [] 9      [] 10  []  No Stress         [x]  [employer] VERY STRESSFUL SITUATION, FOR NEHA, BUT OTHER FAMILY MEMBERS APPEARED CALM.   Support Notes:      Emotional Behavior  Emotional Status:    []  NO SIGNIFICANT FINDINGS  []  Agitation/Restless      []  Angry       [x]  Anxious  PER FAMILY LAWRENCE IS ANXIOUS   []  Avoidant       []  Bereavement /loss issues     []  Clinging     []  Depressed       []  Distraught  []  Euphoric   []  Fearful   []  Flat affect  []  Helpless  []  Hostile   []  Irritable  []  Labile  []  Potential suicide risk  [x] Sad  []  Strained family/social relationships  []  Suspicious  []  Tearful  []  Withdrawn         Coping of Caregiver: Check coping mechanisms observed during assessment  []  Confrontive coping (describes aggressive efforts to alter the situation and suggests some degree of hostility and risk taking)  []  Distancing (describes cognitive efforts to detach oneself and to minimize the significance of the situation)  []  Self-Controlling 9describes efforts to regulate ones own feelings)  []  Social Support (describes efforts to seek informational support, tangible support and emotional support)  []  Accepting (acknowledges ones own role an doing inner work that promotes personal peace)  [x]  Resignation  surrender (to accept no longer fight, to make peace w/circumstances)  []  Escape - Avoidance- Denial (describes wishful thinking and behavioral efforts to escape or avoid)  []  Planful Problem Solving (describes deliberates [id number] efforts to alter the situation)  []  Positive Reappraisal (describes efforts to create positive meaning by focusing on personal growth. It also has a Episcopal dimension)  []  Other:     Significant behavior changed noted: HAVE NOT MET  []  None  []  Alcohol use/abuse  []  Arrest or problems with law enforcement  []  Emotional lability  []  Increased incidence of physical illness/symptoms  []  Loss of interest in activities  []  School performance affected   []  Sleeping patters/habits altered  []  Substance/Drug use and/or abuse  []  Smoking (underage or excessive)  []  Truancy  []  Other:      Emotional/Behavior Notes: HAVE NOT MET    []  Suicidal Ideation Expressed/Discussed   []  Homicidal Ideation Expressed/Discussed           Coping skills (strengths/weaknesses):  HAVE NOT MET, HAS GOOD FAMILY SUPPORT  Support Services Needed/Referrals Required: Fe Acevedo all that apply)     Suggested Resources  []         []  Financial management/counseling  []  Final arrangements/ planning  []  Food/Nutrition resources  []  Home maintenance/repairs/ services  []  Homemaker services  []  Income/Medical coverage assistance  []  Legal Assistance   []  Mental health referral   []  Protective services  []  Relocation to different care setting  []  Transportation   [x]  Other:  PRE BEREAVEMENT            Bereavement Follow-Up Plan:   PLEASE SEE FOR PRE AND  BEREAVEMENT [facility]   Financial  []  Independent: Manages financial affairs without assistance  []  Minimal Assistance: Needs prompting/reminders to pay bills/make deposits/cash checks or manage accounts  []  Moderate Assistance: Needs supervision of all financial tasks  []  Total Assistance: Unable to manage her/his own financial affairs  [x]  Unknown  Notes     Survivor Risk Assessment Summary (Actual or Potential Risks to the Bereavement Process):     []  Abuse or history of abuse observed or reported within family system  [x]  Concurrent stressful events or situations observed or reported  [x]  Dependent children reside within household  []  Family discord exhibited/described  []  Feelings of guilt expressed by family members  []  Financial status significantly affected by illness/death  []  Marital discord exhibited/described  []  Neglect observed or reported within the family system  []  Patient is a single-parent with dependent children  []  Psychiatric illness (or history) reported  []  Social Support systems limited  []  Spiritual distress observed or reported  []  Survivor frail/elderly/dependent  [x]  Survivor showing emotional and/or physical signs of stress/distress LAWRENCE  []  Other (specify):  Notes:     Cultural Perspective Regarding Death:    []  Yes    [x]  No  Cultural Notes     Bereavement Assessment:     []  LOW RISK Indications:    []  normal grief   []  good support    []  open expression      [x]  MODERATE RISK Indications:    []  grief normal but severe    []  depression (taking meds. professional help)   []  somatic distress   []  low support    []  Hx of difficulty w/ loss    []  unresolved conflict w/ the .  Kaya Yuen  []  HIGH RISK Indications:    []  Hx mental illness    []  Suicidal ideation    []  Depression (no meds.  or prof. help)   []  Somatic distress   []  Excessive guilt or anger    []  Multiple losses    []  unresolved losses , other life crises.        MSW Assessment Completed by: Emilio Choi  16    Time In:5:30        Time Out:6;30

## 2017-01-16 NOTE — PROGRESS NOTES
[address] [facility] Help to Those in Need  [phone]    Patient Name: Guille Stephenson  YOB: 1975    Date of Provider Hospice Visit: 17    Level of Care:   [x] General Inpatient (GIP)    [] Routine   [] Respite    Location of Care:  [x] [facility] [] [facility] [] [address] [address] [facility] [] [facility] [] Hospice [facility]  [] Home [] Other:      Date of Original Hospice Admission: 17  Hospice AttendinBetty Novoa Diagnosis:HIV/AIDS  Diagnoses RELATED to the terminal prognosis: Respiratory failure, Pneumocystis pneumonia, CMV viremia, recurrent seizure, brain lesions with mass effect, septic shock, s/p extubation  Other Diagnoses:      HOSPICE NARRATIVE COMPOSED BY PHYSICIAN   Rationale for a prognosis of life expectancy of 6 months or less if the disease follows its normal course:    Guille Stephenson is a 42y.o. year old who was admitted to [facility]. The patient's principle diagnosis of AIDS/HIV has resulted in CMV viremia, respiratory failure(intubated), pneumocystis pneumonia, sepitc shock and seizures. Functionally, the patient's Palliative Performance Scale has declined over a period of weeks and is estimated at 10. Objective information that support this patients limited prognosis includes: Chart reviewed. Patient admitted with respiratory failure, septic shock. Patient had stopped her antiviral medication probably 6 months prior. Patient had many complications related to her AIDs diagnosis to include pneumocystis pneumonia, brain lesions which may be related lymphoma vs infection causing seizures, CMV viremia, anemia, viral load >800,000. Patient continued to decline and ultimately family shifted focus toward comfort and was extubated. Her mother was assigned as MPOA. The patient/family chose comfort measures with the support of Hospice. HOSPICE DIAGNOSES   Active Symptoms:  1. Seizures, intractable-seizing when I am in the room  2. SOB  3. Agitation  4.  Respiratory failure  5. Secretions     PLAN   1. Patient admitted [facility] for many symptomatic issues. 2. SOB- on dilaudid pca with 1 mg/ hour basal rate, has not needed nurse initiated boluses. Patient unresponsive and appears comfortable. 3. Seizures- none witnessed by staff since change/ initiation of meds. Received 3 doses of prn 1 mg Ativan along with scheduled meds yesterday. Has not needed any today. Continue with scheduled Ativan. 4. No family at bedside. 5. No urine output over the past 12 hours. I suspect she is transitioning to active dying phase. 6. Secretions-robinul will be scheduled. 7. Other comfort meds ordered prn    8.  and SW to support family needs  9. Disposition: home if stabilizes. Talked with daughter and son who are at bedside. Patient has 4 children. Mom MPOA but not at bedside    Prognosis estimated based on 01/16/17 clinical assessment is:   [x] Few to Many Hours  [] Few to Many Days    [] Few to Many Weeks    [] Few to Many Months    Communicated plan of care with: Hospice Case Manager;  Hospice IDT; Care Team     GOALS OF CARE     Resuscitation Status: DNR  Durable DNR: [x] Yes [] No    Advance Care Planning 12/28/2016   Patient's Healthcare Decision Maker is: Named in scanned ACP document   Primary Decision Maker Name Reanna Mcneal   Primary Decision Maker Phone Number [phone]   Primary Decision Maker Relationship to Patient Parent   Confirm Advance Directive Yes, on file        HISTORY     History obtained from: chart, nursing staff, children    CHIEF COMPLAINT: shortness of breaht  The patient is:   [] Verbal  [] Nonverbal  [x] Unresponsive    HPI/SUBJECTIVE:  Patient seizing when I am the room with rapid eyes movement       REVIEW OF SYSTEMS     The following systems were: [] reviewed  [x] unable to be reviewed    Positive ROS include:  Constitutional:  Ears/nose/mouth/throat:  Respiratory:  Gastrointestinal:  Musculoskeletal:  Neurologic:  Psychiatric:  Endocrine:     Adult Non-Verbal Pain Assessment Score: 7    Face  [] 0   No particular expression or smile  [] 1   Occasional grimace, tearing, frowning, wrinkled forehead  [x] 2   Frequent grimace, tearing, frowning, wrinkled forehead    Activity (movement)  [] 0   Lying quietly, normal position  [] 1   Seeking attention through movement or slow, cautious movement  [x] 2   Restless, excessive activity and/or withdrawal reflexes    Guarding  [] 0   Lying quietly, no positioning of hands over areas of body  [x] 1   Splinting areas of the body, tense  [] 2   Rigid, stiff    Physiology (vital signs)  [x] 0   Stable vital signs  [] 1   Change in any of the following: SBP > 20mm Hg; HR > 20/minute  [] 2   Change in any of the following: SBP > 30mm Hg; HR > 25/minute    Respiratory  [] 0   Baseline RR/SpO2, compliant with ventilator  [] 1   RR > 10 above baseline, or 5% drop SpO2, mild asynchrony with ventilator  [x] 2   RR > 20 above baseline, or 10% drop SpO2, asynchrony with ventilator     FUNCTIONAL ASSESSMENT     Palliative Performance Scale (PPS):10     PSYCHOSOCIAL/SPIRITUAL ASSESSMENT     Active Problems:    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 Social History   Substance Use Topics    Smoking status: Never Smoker    Smokeless tobacco: Not on file    Alcohol use Yes     No family history on file.    No Known Allergies   Current Facility-Administered Medications   Medication Dose Route Frequency    HYDROmorphone (PF) 15 mg/30 ml (DILAUDID) PCA   IntraVENous CONTINUOUS    LORazepam (ATIVAN) injection 2 mg  2 mg IntraVENous Q4H    ketorolac (TORADOL) injection 30 mg  30 mg IntraVENous Q8H PRN    scopolamine (TRANSDERM-SCOP) 1.5 mg  1.5 mg TransDERmal Q72H PRN    glycopyrrolate (ROBINUL) injection 0.2 mg  0.2 mg IntraVENous Q4H PRN    bisacodyl (DULCOLAX) suppository 10 mg  10 mg Rectal DAILY PRN    levETIRAcetam (KEPPRA) 2,000 mg in 0.9% sodium chloride IVPB  2,000 mg IntraVENous Q12H    ondansetron (ZOFRAN) injection 4 mg  4 mg IntraVENous Q4H PRN    LORazepam (ATIVAN) injection 1 mg  1 mg IntraVENous Q15MIN PRN    saline peripheral flush soln 5 mL  5 mL InterCATHeter PRN    chlorhexidine (PERIDEX) 0.12 % mouthwash 15 mL  15 mL Oral BID        PHYSICAL EXAM     Wt Readings from Last 3 Encounters:   01/13/17 113 lb (51.3 kg)   01/03/17 116 lb 6.5 oz (52.8 kg)   12/27/16 85 lb 15.7 oz (39 kg)       Visit Vitals    BP 98/66 (BP 1 Location: Left arm, BP Patient Position: At rest)    Pulse 90    Temp 98.2 °F (36.8 °C)    Resp 26    SpO2 93%       Supplemental O2  [x] Yes  [] NO  Last bowel movement:     Currently this patient has:  [x] Peripheral IV [] PICC  [] PORT [] ICD    [x] Alfaro Catheter [] NG Tube   [] PEG Tube    [] Rectal Tube [] Drain  [] Other:     Constitutional: ill appearing with active seizure  Eyes: rapid eye movement to the left  ENMT: foaming at the mouth  Cardiovascular: tachycardia  Respiratory: moderate respiratory distress with lots of secretions  Gastrointestinal: soft/nt  Musculoskeletal:muscle wasting  Skin: warm, dry  Neurologic: seizing, unresponsive      Pertinent Lab and or Imaging Tests:  Lab Results   Component Value Date/Time    Sodium 135 01/12/2017 03:06 AM    Potassium 3.9 01/12/2017 03:06 AM    Chloride 102 01/12/2017 03:06 AM    CO2 25 01/12/2017 03:06 AM    Anion gap 8 01/12/2017 03:06 AM    Glucose 120 01/12/2017 03:06 AM    BUN 13 01/12/2017 03:06 AM    Creatinine 0.40 01/12/2017 03:06 AM    BUN/Creatinine ratio 33 01/12/2017 03:06 AM    GFR est AA >60 01/12/2017 03:06 AM    GFR est non-AA >60 01/12/2017 03:06 AM    Calcium 7.8 01/12/2017 03:06 AM     Lab Results   Component Value Date/Time    Protein, total 5.4 01/12/2017 03:06 AM    Albumin 1.7 01/12/2017 03:06 AM           Total time: 70  Counseling / coordination time:   > 50% counseling / coordination?:     This patient meets Hospice General Inpatient (GIP) Level of Care.     The precipitating event that resulted in the need for GIP was: shortness of breath    The interventions tried that have been unsuccessful at controlling symptoms include: to unstable to manage at home    Supporting documentation for GIP need for pain control:  [x] Frequent evaluation by a doctor, nurse practitioner, nurse   [x] Frequent medication adjustment    [x] IVs that cannot be administered at home   [] Aggressive pain management   [] Complicated technical delivery of medications              Supporting documentation for GIP need for symptom control:  []  Sudden decline necessitating intensive nursing intervention  []  Uncontrolled / intractable nausea or vomiting   []  Pathological fractures  []  Advanced open wounds requiring frequent skilled care  [x] Unmanageable respiratory distress  [] New or worsening delirium   [] Delirium with behavior issues  [] Imminent death  with skilled nursing needs documented above   Intractable seizures

## 2017-01-16 NOTE — PROGRESS NOTES
Bedside shift change report given to Kady Bates RN (oncoming nurse) by Shelley Severin, RN (offgoing nurse). Report included the following information SBAR, Kardex, MAR and Recent Results.

## 2017-01-16 NOTE — PROGRESS NOTES
Bedside shift change report given to George Agrawal RN (oncoming nurse) by Hanna Salter RN (offgoing nurse). Report included the following information SBAR and MAR.

## 2017-01-16 NOTE — HOSPICE
visited Ms. Lange (room 619) in order to complete a spiritual assessment. Prior to entering the room  called and left a voicemail for the patient's mother, Diamond Russell, introducing self/role and requesting a returned phone call back. Ms. Gilles Trimble was found to unresponsive, breathing more labored, agonal.  No family/friends were present during this encounter.  sat as a comforting presence playing music and offering up prayers from comfort and peace.  unable to complete spiritual assessment due to patient's medical condition. Aaron Lomeli will continue to offer Jain/support to both patient and family.         Frequency: Begin routine visits on Sunday, Jan. 22, 2017 1 week 2; 5 PRN 14.    Ethel Wolfe., [address]   W: [phone]  F: [phone]   [email or web address]   Good Help to Those in [facility]

## 2017-01-16 NOTE — HOSPICE
[facility] Help to Those in Need  [phone]    [facility] Daily Nursing Note   Patient Name: Selina Macedo  YOB: 1975  Age: 42 y.o. 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 Seizures:   2. SOB  3. Agitation  4. Respiratory failure  5. Secretions        ASSESSMENT & PLAN   Must update Plan of Care including visit frequencies for IDT members  1. Seizures: RN reported that no seizure activity in past  24 hours. Continued on Keppra and Ativan  2. SOB: respirations even and unlabored. 3. Agitation: well palliated on current medications  4. Respiratory failure: patient on room air, saturations 93%  5:Secretions:      Spiritual Interventions:     Psych/ Social/ Emotional Interventions: Family not present at time of assessment. SW following up with family. Care Coordination Needs: Care plan discussed with primary RN, Dr. Jose Dillard and IDT    Care plan and New Orders discussed / approved with Jose Dillard MD.    Description History and Chart Review   If this is initial GIP note must document RN assessment/MD communication in previous setting. Specifically document nursing/medication needs in last 24 hours to support GIP care  Narrative History of last 24 hours that demonstrates care cannot be provided in another setting:  Patient is a 43 YO  female who was hospitalized with complications related to her AIDs diagnosis to include pneumocystis pneumonia, brain lesions which may be related lymphoma vs infection causing seizures, CMV viremia, anemia, viral load >800,000. Patient continued to decline and ultimately family shifted focus toward comfort and was extubated. Patient was admitted to inpatient hospice, [facility] level of care on 1/14/2016.  Patient has had seizure activity despite being on IV Kepra. .She remains unresponsive, breathing stabilized on room air, O2 sats 93%, agonal. No seizure activity noted in past 24 hours       What has been done to control the patient's symptoms in the last 24 hours? Dilaudid PCA continued with prn boluses. . Ativan scheduled every 4 hours. Robinul scheduled       Does the patient currently require IV medications? yes  Does the patient currently require scheduled medications? yes  Does the patient currently require a PCA? yes    List number of doses of PRN medications in last 24 hours:  Medication 1: Ativan 1 mg  Number of doses:  3    Medication 2:   Number of doses:    Medication 3:   Number of doses:    Supporting documentation for GIP need for pain control:  [x] Frequent evaluation by a doctor, nurse practitioner, nurse   [x] Frequent medication adjustment    [x] IVs that cannot be administered at home   [] Aggressive pain management   [x] Complicated technical delivery of medications              Supporting documentation for GIP need for symptom control:  [x]  Sudden decline necessitating intensive nursing intervention  []  Uncontrolled / intractable nausea or vomiting   []  Pathological fractures  []  Advanced open wounds requiring frequent skilled care  [] Unmanageable respiratory distress  [] New or worsening delirium   [] Delirium with behavior issues: Is 24 hour caregiver present due to safety concerns with agitation? (yes/no)  [] Imminent death  with skilled nursing needs documented above    DISCHARGE PLANNING   Daily discharge planning required for GIP  1. Discharge Plan: Patient will likely die in hospital. If she stablizes on routing level of care, [facility] or nursing home with hospice  2. Patient/Family teaching: Family not present  3.  Response to patient/family teaching:     ASSESSMENT    KARNOFSKY: 10-20  Prognosis estimated based on 01/16/17 clinical assessment is:   [x] Few to Many Hours  [] Hours to Days   [] Few to Many Days   [] Days to Weeks   [] Few to Many Weeks   [] Weeks to Months   [] Few to Many Months    Quality Measure: Patient self-reports:  [] Yes    [] No    ESAS:   Time of Assessment: 10  Pain (1-10): 2  Fatigue (1-10): 10  Shortness of breath (1-10): 2  Nausea (1-10): 0  Appetite (1-10):    Anxiety: (1-10):   Depression: (1-10):   Well-being: (1-10):   Constipation: _ Yes  _ No  LAST BM:     CLINICAL INFORMATION   Patient Vitals for the past 12 hrs:   Temp Pulse Resp BP SpO2   01/16/17 0847 98.2 °F (36.8 °C) 90 26 98/66 93 %       Currently this patient has:  [] Supplemental O2   [x] IV    [] PICC      [] PORT   [] NG Tube    [] PEG Tube   [] Ostomy     [x] Alfaro draining  Ruthy urine  [] Other:     SIGNS/PHYSICAL FINDINGS     Skin (including wound):  [x] Warm, dry, supple, intact and color normal for race  [] Warm   [] Dry   [] Cool     [] Clammy       [] Diaphoretic    Turgor   [] Normal   [x] Decreased  Color:   [] Pink   [] Pale   [] Cyanotic   [] Erythema   [] Jaundice   [x] Normal for Race  []  Wounds:    Neuro:  [x] Lethargy  [] Restlessness / agitation  [] Confusion / delirium  [] Hallucinations  [] Responds to maximal stimulation  [x] Unresponsive  [] Seizures     Cardiac:  [] Dyspnea on Exertion  [] JVD  [] Murmur  [] Palpitations  [] Hypotension  [] Hypertension  [] Tachycardia  [] Bradycardia  [] Irregular HR  [] Pulses Decreased  [] Pulses Absent  [] Edema:       (Location, Grade and Pitting)  [] Mottling:      (Location)    Respiratory:  Breath sounds:    [x] Diminished   [] Wheeze   [x] Rhonchi   [] Rales   [] Even and unlabored  [] Labored:            [] Cough   [] Non Productive   [] Productive    [] Description:           [] Deep suctioned   [] O2 at ___ LPM  [] High flow oxygen greater than 10 LPM  [] Bi-Pap    GI  [] Abdomen (describe)   [] Ascites  [] Nausea  [] Vomiting  [] Incontinent of bowels  [x] Bowel sounds (yes/no) yes hypoactive  [] Diarrhea  [] Constipation (see above including last bowel movement)  [] Checked for impaction  [] Last BM     Nutrition  Diet: NPO  Appetite:   [] Good   [] Fair   [] Poor   [] Tube Feeding       [] Voiding  [] Incontinent   [] Alfaro    Musculoskeletal  [] Balance/[place] Unsteady   [] Weak   Strength:    [] Normal    [] Limited    [x] Decreasing   Activities:    [] Up as tolerated   [x] Bedridden    [] Specify:    SAFETY  [] 24 hr. Caregiver   [x] Side rails ?     [x] Hospital bed   [] Reviewed Falls & Safety       ALLERGIES AND MEDICATIONS     Allergies: No Known Allergies    Current Facility-Administered Medications   Medication Dose Route Frequency    HYDROmorphone (PF) 15 mg/30 ml (DILAUDID) PCA   IntraVENous CONTINUOUS    LORazepam (ATIVAN) injection 2 mg  2 mg IntraVENous Q4H    ketorolac (TORADOL) injection 30 mg  30 mg IntraVENous Q8H PRN    scopolamine (TRANSDERM-SCOP) 1.5 mg  1.5 mg TransDERmal Q72H PRN    glycopyrrolate (ROBINUL) injection 0.2 mg  0.2 mg IntraVENous Q4H PRN    bisacodyl (DULCOLAX) suppository 10 mg  10 mg Rectal DAILY PRN    levETIRAcetam (KEPPRA) 2,000 mg in 0.9% sodium chloride IVPB  2,000 mg IntraVENous Q12H    ondansetron (ZOFRAN) injection 4 mg  4 mg IntraVENous Q4H PRN    LORazepam (ATIVAN) injection 1 mg  1 mg IntraVENous Q15MIN PRN    saline peripheral flush soln 5 mL  5 mL InterCATHeter PRN    chlorhexidine (PERIDEX) 0.12 % mouthwash 15 mL  15 mL Oral BID          Visit Time In: 1000  Visit Time Out: 10:30

## 2017-01-17 NOTE — HOSPICE
[facility]  [phone]    GIP Daily Nursing Note   Patient Name: Kalie Day  YOB: 1975  Age: 42 y.o. 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 ***        ASSESSMENT & PLAN   Must update Plan of Care including visit frequencies for IDT members  1.  ***      Spiritual Interventions: ***    Psych/ Social/ Emotional Interventions: ***    Care Coordination Needs: ***    Care plan and New Orders discussed / approved with *** MD.    Description History and Chart Review   If this is initial GIP note must document RN assessment/MD communication in previous setting. Specifically document nursing/medication needs in last 24 hours to support GIP care  Narrative History of last 24 hours that demonstrates care cannot be provided in another setting:  ***    What has been done to control the patient's symptoms in the last 24 hours? ***    Does the patient currently require IV medications? ***  Does the patient currently require scheduled medications? ***  Does the patient currently require a PCA?  ***    List number of doses of PRN medications in last 24 hours:  Medication 1:  Number of doses:    Medication 2:   Number of doses:    Medication 3:   Number of doses:    Supporting documentation for GIP need for pain control:  [] Frequent evaluation by a doctor, nurse practitioner, nurse   [] Frequent medication adjustment    [] IVs that cannot be administered at home   [] Aggressive pain management   [] Complicated technical delivery of medications              Supporting documentation for GIP need for symptom control:  []  Sudden decline necessitating intensive nursing intervention  []  Uncontrolled / intractable nausea or vomiting   []  Pathological fractures  []  Advanced open wounds requiring frequent skilled care  [] Unmanageable respiratory distress  [] New or worsening delirium   [] Delirium with behavior issues: Is 24 hour caregiver present due to safety concerns with agitation? (yes/no)  [] Imminent death  with skilled nursing needs documented above    DISCHARGE PLANNING   Daily discharge planning required for GIP  1. Discharge Plan: ***  2. Patient/Family teaching: ***  3. Response to patient/family teaching: ***    ASSESSMENT    KARNOFSKY: ***    Prognosis estimated based on 01/17/17 clinical assessment is:   [] Few to Many Hours  [] Hours to Days   [] Few to Many Days   [] Days to Weeks   [] Few to Many Weeks   [] Weeks to Months   [] Few to Many Months    Quality Measure: Patient self-reports:  [] Yes    [] No    ESAS:   Time of Assessment: ***  Pain (1-10):***  Fatigue (1-10): ***  Shortness of breath (1-10):***  Nausea (1-10): ***  Appetite (1-10):    Anxiety: (1-10):   Depression: (1-10):   Well-being: (1-10):   Constipation: _ Yes  _ No  LAST BM: ***    CLINICAL INFORMATION   Patient Vitals for the past 12 hrs:   Temp Pulse Resp BP SpO2   01/17/17 0835 97.6 °F (36.4 °C) 76 (!) 32 (!) 76/53 100 %       Currently this patient has:  [] Supplemental O2   [] IV    [] PICC      [] PORT   [] NG Tube    [] PEG Tube   [] Ostomy     [] Alfaro draining _______ urine  [] Other:     SIGNS/PHYSICAL FINDINGS     Skin (including wound):  [] Warm, dry, supple, intact and color normal for race  [] Warm   [] Dry   [] Cool     [] Clammy       [] Diaphoretic    Turgor   [] Normal   [] Decreased  Color:   [] Pink   [] Pale   [] Cyanotic   [] Erythema   [] Jaundice   [] Normal for Race  []  Wounds:    Neuro:  [] Lethargy  [] Restlessness / agitation  [] Confusion / delirium  [] Hallucinations  [] Responds to maximal stimulation  [] Unresponsive  [] Seizures     Cardiac:  [] Dyspnea on Exertion  [] JVD  [] Murmur  [] Palpitations  [] Hypotension  [] Hypertension  [] Tachycardia  [] Bradycardia  [] Irregular HR  [] Pulses Decreased  [] Pulses Absent  [] Edema:       (Location, Grade and Pitting)  [] Mottling:      (Location)    Respiratory:  Breath sounds:    [] Diminished   [] Wheeze   [] Rhonchi   [] Rales   [] Even and unlabored  [] Labored:            [] Cough   [] Non Productive   [] Productive    [] Description:           [] Deep suctioned   [] O2 at ___ LPM  [] High flow oxygen greater than 10 LPM  [] Bi-Pap    GI  [] Abdomen (describe)   [] Ascites  [] Nausea  [] Vomiting  [] Incontinent of bowels  [] Bowel sounds (yes/no)  [] Diarrhea  [] Constipation (see above including last bowel movement)  [] Checked for impaction  [] Last BM ***    Nutrition  Diet:__________  Appetite:   [] Good   [] Fair   [] Poor   [] Tube Feeding       [] Voiding  [] Incontinent   [] Alfaro    Musculoskeletal  [] Balance/[place] Unsteady   [] Weak   Strength:    [] Normal    [] Limited    [] Decreasing   Activities:    [] Up as tolerated   [] Bedridden    [] Specify:    SAFETY  [] 24 hr. Caregiver   [] Side rails ?     [] Hospital bed   [] Reviewed Falls & Safety       ALLERGIES AND MEDICATIONS     Allergies: No Known Allergies    Current Facility-Administered Medications   Medication Dose Route Frequency    HYDROmorphone (PF) 15 mg/30 ml (DILAUDID) PCA   IntraVENous CONTINUOUS    LORazepam (ATIVAN) injection 2 mg  2 mg IntraVENous Q4H    scopolamine (TRANSDERM-SCOP) 1.5 mg  1.5 mg TransDERmal Q72H PRN    glycopyrrolate (ROBINUL) injection 0.2 mg  0.2 mg IntraVENous Q4H PRN    bisacodyl (DULCOLAX) suppository 10 mg  10 mg Rectal DAILY PRN    levETIRAcetam (KEPPRA) 2,000 mg in 0.9% sodium chloride IVPB  2,000 mg IntraVENous Q12H    ondansetron (ZOFRAN) injection 4 mg  4 mg IntraVENous Q4H PRN    LORazepam (ATIVAN) injection 1 mg  1 mg IntraVENous Q15MIN PRN    saline peripheral flush soln 5 mL  5 mL InterCATHeter PRN    chlorhexidine (PERIDEX) 0.12 % mouthwash 15 mL  15 mL Oral BID          Visit Time In: ***  Visit Time Out: ***

## 2017-01-17 NOTE — PROGRESS NOTES
Bedside shift change report given to Elana Ahmadi RN (oncoming nurse) by Keyon Love RN (offgoing nurse). Report included the following information SBAR and MAR.

## 2017-01-17 NOTE — PROGRESS NOTES
Bedside shift change report given to [address] [place] [address] (oncoming nurse) by Ally Ferrara (offgoing nurse). Report included the following information SBAR, Kardex, MAR and Recent Results.

## 2017-01-17 NOTE — PROGRESS NOTES
[facility]  [phone]    Patient Name: Regino Andersen  YOB: 1975    Date of Provider Hospice Visit: 17    Level of Care:   [x] General Inpatient (GIP)    [] Routine   [] Respite    Location of Care:  [x] [facility] [] [address] [] [facility] [] [facility] [] Hospice [facility]  [] Home [] Other:      Date of Original Hospice Admission: 17  Hospice AttendinBetty Novoa Diagnosis:HIV/AIDS  Diagnoses RELATED to the terminal prognosis: Respiratory failure, Pneumocystis pneumonia, CMV viremia, recurrent seizure, brain lesions with mass effect, septic shock, s/p extubation  Other Diagnoses:      HOSPICE NARRATIVE COMPOSED BY PHYSICIAN   Rationale for a prognosis of life expectancy of 6 months or less if the disease follows its normal course:    Regino Andersen is a 42y.o. year old who was admitted to [address] [place] [address]. The patient's principle diagnosis of AIDS/HIV has resulted in CMV viremia, respiratory failure(intubated), pneumocystis pneumonia, sepitc shock and seizures. Functionally, the patient's Palliative Performance Scale has declined over a period of weeks and is estimated at 10. Objective information that support this patients limited prognosis includes: Chart reviewed. Patient admitted with respiratory failure, septic shock. Patient had stopped her antiviral medication probably 6 months prior. Patient had many complications related to her AIDs diagnosis to include pneumocystis pneumonia, brain lesions which may be related lymphoma vs infection causing seizures, CMV viremia, anemia, viral load >800,000. Patient continued to decline and ultimately family shifted focus toward comfort and was extubated. Her mother was assigned as MPOA. The patient/family chose comfort measures with the support of Hospice. HOSPICE DIAGNOSES   Active Symptoms:  1. Seizures, intractable-seizing when I am in the room  2. SOB  3. Agitation  4.  Respiratory failure  5. Secretions     PLAN   1. Patient admitted [facility] for many symptomatic issues. 2. SOB- on dilaudid pca with 1 mg/ hour basal rate, has not needed nurse initiated boluses. Patient unresponsive and appears comfortable. 3. Seizures- none witnessed by staff since change/ initiation of meds. Received 3 doses of prn 1 mg Ativan along with scheduled meds yesterday. Has not needed any today. Continue with scheduled Ativan. 4. No family at bedside. 5. Minimal to no urine output. I suspect she is transitioning to active dying phase. 6. Secretions-robinul will be scheduled. 7. Other comfort meds ordered prn    8.  and SW to support family needs  9. Disposition: home if stabilizes. Talked with daughter and son who are at bedside. Patient has 4 children. Mom MPOA but not at bedside    Prognosis estimated based on 01/17/17 clinical assessment is:   [x] Few to Many Hours  [] Few to Many Days    [] Few to Many Weeks    [] Few to Many Months    Communicated plan of care with: Hospice Case Manager;  Hospice IDT; Care Team     GOALS OF CARE     Resuscitation Status: DNR  Durable DNR: [x] Yes [] No    Advance Care Planning 1/16/2017   Patient's Healthcare Decision Maker is: Named in scanned ACP document   Primary Decision Maker Name Bradly Dodson   Primary Decision Maker Phone Number [phone]   Primary Decision Maker Relationship to Patient Parent   Confirm Advance Directive Yes, not on file        HISTORY     History obtained from: chart, nursing staff, children    CHIEF COMPLAINT: shortness of breaht  The patient is:   [] Verbal  [] Nonverbal  [x] Unresponsive    HPI/SUBJECTIVE:  Patient seizing when I am the room with rapid eyes movement       REVIEW OF SYSTEMS     The following systems were: [] reviewed  [x] unable to be reviewed    Positive ROS include:  Constitutional:  Ears/nose/mouth/throat:  Respiratory:  Gastrointestinal:  Musculoskeletal:  Neurologic:  Psychiatric:  Endocrine:     Adult Non-Verbal Pain Assessment Score: 7    Face  [] 0   No particular expression or smile  [] 1   Occasional grimace, tearing, frowning, wrinkled forehead  [x] 2   Frequent grimace, tearing, frowning, wrinkled forehead    Activity (movement)  [] 0   Lying quietly, normal position  [] 1   Seeking attention through movement or slow, cautious movement  [x] 2   Restless, excessive activity and/or withdrawal reflexes    Guarding  [] 0   Lying quietly, no positioning of hands over areas of body  [x] 1   Splinting areas of the body, tense  [] 2   Rigid, stiff    Physiology (vital signs)  [x] 0   Stable vital signs  [] 1   Change in any of the following: SBP > 20mm Hg; HR > 20/minute  [] 2   Change in any of the following: SBP > 30mm Hg; HR > 25/minute    Respiratory  [] 0   Baseline RR/SpO2, compliant with ventilator  [] 1   RR > 10 above baseline, or 5% drop SpO2, mild asynchrony with ventilator  [x] 2   RR > 20 above baseline, or 10% drop SpO2, asynchrony with ventilator     FUNCTIONAL ASSESSMENT     Palliative Performance Scale (PPS):10     PSYCHOSOCIAL/SPIRITUAL ASSESSMENT     Active Problems:    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 Social History   Substance Use Topics    Smoking status: Never Smoker    Smokeless tobacco: Not on file    Alcohol use Yes     No family history on file.    No Known Allergies   Current Facility-Administered Medications   Medication Dose Route Frequency    HYDROmorphone (PF) 15 mg/30 ml (DILAUDID) PCA   IntraVENous CONTINUOUS    LORazepam (ATIVAN) injection 2 mg  2 mg IntraVENous Q4H    scopolamine (TRANSDERM-SCOP) 1.5 mg  1.5 mg TransDERmal Q72H PRN    glycopyrrolate (ROBINUL) injection 0.2 mg  0.2 mg IntraVENous Q4H PRN    bisacodyl (DULCOLAX) suppository 10 mg  10 mg Rectal DAILY PRN    levETIRAcetam (KEPPRA) 2,000 mg in 0.9% sodium chloride IVPB  2,000 mg IntraVENous Q12H    ondansetron (ZOFRAN) injection 4 mg  4 mg IntraVENous Q4H PRN    LORazepam (ATIVAN) injection 1 mg  1 mg IntraVENous Q15MIN PRN    saline peripheral flush soln 5 mL  5 mL InterCATHeter PRN    chlorhexidine (PERIDEX) 0.12 % mouthwash 15 mL  15 mL Oral BID        PHYSICAL EXAM     Wt Readings from Last 3 Encounters:   01/13/17 113 lb (51.3 kg)   01/03/17 116 lb 6.5 oz (52.8 kg)   12/27/16 85 lb 15.7 oz (39 kg)       Visit Vitals    BP (!) 76/53 (BP 1 Location: Left arm, BP Patient Position: At rest)    Pulse 76    Temp 97.6 °F (36.4 °C)    Resp (!) 32    SpO2 100%       Supplemental O2  [x] Yes  [] NO  Last bowel movement:     Currently this patient has:  [x] Peripheral IV [] PICC  [] PORT [] ICD    [x] Alfaro Catheter [] NG Tube   [] PEG Tube    [] Rectal Tube [] Drain  [] Other:     Constitutional: ill appearing with active seizure  Eyes: rapid eye movement to the left  ENMT: foaming at the mouth  Cardiovascular: tachycardia  Respiratory: moderate respiratory distress with lots of secretions  Gastrointestinal: soft/nt  Musculoskeletal:muscle wasting  Skin: warm, dry  Neurologic: seizing, unresponsive      Pertinent Lab and or Imaging Tests:  Lab Results   Component Value Date/Time    Sodium 135 01/12/2017 03:06 AM    Potassium 3.9 01/12/2017 03:06 AM    Chloride 102 01/12/2017 03:06 AM    CO2 25 01/12/2017 03:06 AM    Anion gap 8 01/12/2017 03:06 AM    Glucose 120 01/12/2017 03:06 AM    BUN 13 01/12/2017 03:06 AM    Creatinine 0.40 01/12/2017 03:06 AM    BUN/Creatinine ratio 33 01/12/2017 03:06 AM    GFR est AA >60 01/12/2017 03:06 AM    GFR est non-AA >60 01/12/2017 03:06 AM    Calcium 7.8 01/12/2017 03:06 AM     Lab Results   Component Value Date/Time    Protein, total 5.4 01/12/2017 03:06 AM    Albumin 1.7 01/12/2017 03:06 AM           Total time: 70  Counseling / coordination time:   > 50% counseling / coordination?:     This patient meets Hospice General Inpatient (GIP) Level of Care.     The precipitating event that resulted in the need for GIP was: shortness of breath    The interventions tried that have been unsuccessful at controlling symptoms include: to unstable to manage at home    Supporting documentation for GIP need for pain control:  [x] Frequent evaluation by a doctor, nurse practitioner, nurse   [x] Frequent medication adjustment    [x] IVs that cannot be administered at home   [] Aggressive pain management   [] Complicated technical delivery of medications              Supporting documentation for GIP need for symptom control:  []  Sudden decline necessitating intensive nursing intervention  []  Uncontrolled / intractable nausea or vomiting   []  Pathological fractures  []  Advanced open wounds requiring frequent skilled care  [x] Unmanageable respiratory distress  [] New or worsening delirium   [] Delirium with behavior issues  [] Imminent death  with skilled nursing needs documented above   Intractable seizures

## 2017-01-17 NOTE — PROGRESS NOTES
Bedside shift change report given to donna (oncoming nurse) by Rivera Moeller (offgoing nurse). Report included the following information SBAR, Kardex and MAR.

## 2017-01-18 NOTE — HOSPICE
[facility]  [phone]    GIP Daily Nursing Note   Patient Name: Yulissa Singh  YOB: 1975  Age: 42 y.o. 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 Dyspnea, pain        ASSESSMENT & PLAN   Must update Plan of Care including visit frequencies for IDT members  1. Pain PCA dilaudid to continue  2. Dyspnea PCA dilaudid, IV lorazepam, scopolamine patch, new order for dilaudid prn IV push      Spiritual Interventions:     Psych/ Social/ Emotional Interventions:     Care Coordination Needs: [id number] staff, staff RN, physician    Care plan and New Orders discussed / approved with Eliezer Drake MD.    Description History and Chart Review   If this is initial GIP note must document RN assessment/MD communication in previous setting. Specifically document nursing/medication needs in last 24 hours to support GIP care  Narrative History of last 24 hours that demonstrates care cannot be provided in another setting:  Patient  continues to require intermittent dosing to manage seizures, pain, dyspnea, alteration in airway clearence; for excessive secretions avoid sublingual route. What has been done to control the patient's symptoms in the last 24 hours? PCA, IV Keppra, IV ativan prn, IV dilaudid push prn    Does the patient currently require IV medications? yes  Does the patient currently require scheduled medications? yes  Does the patient currently require a PCA?  yes    List number of doses of PRN medications in last 24 hours:  Medication 1: Robinul   Number of doses:1    Medication 2: Ativan  Number of doses: 1    Medication 3: Scopolamine added today  Number of doses:    Supporting documentation for GIP need for pain control:  [x] Frequent evaluation by a doctor, nurse practitioner, nurse   [x] Frequent medication adjustment    [x] IVs that cannot be administered at home   [x] Aggressive pain management   [x] Complicated technical delivery of medications              Supporting documentation for GIP need for symptom control:  [x]  Sudden decline necessitating intensive nursing intervention  []  Uncontrolled / intractable nausea or vomiting   []  Pathological fractures  []  Advanced open wounds requiring frequent skilled care  [x] Unmanageable respiratory distress  [] New or worsening delirium   [] Delirium with behavior issues: Is 24 hour caregiver present due to safety concerns with agitation? (yes/no)  [] Imminent death  with skilled nursing needs documented above    DISCHARGE PLANNING   Daily discharge planning required for GIP  1. Discharge Plan: most likely will  inpatient  2. Patient/Family teaching:   3. Response to patient/family teaching:    ASSESSMENT    KARNOFSKY: 10    Prognosis estimated based on 17 clinical assessment is:   [] Few to Many Hours  [x] Hours to Days   [] Few to Many Days   [] Days to Weeks   [] Few to Many Weeks   [] Weeks to Months   [] Few to Many Months    Quality Measure: Patient self-reports:  [] Yes    [x] No    ESAS:   Time of Assessment: 11:00 a.m  Pain (1-10): 0   Fatigue (1-10):   Shortness of breath (1-10): 6  Nausea (1-10): Appetite (1-10):    Anxiety: (1-10):   Depression: (1-10):   Well-being: (1-10):   Constipation: _ Yes  _ No  LAST BM:     CLINICAL INFORMATION   Patient Vitals for the past 12 hrs:   Temp Pulse Resp BP SpO2   17 0846 95 °F (35 °C) 76 (!) 36 (!) 77/44 99 %       Currently this patient has:  [x] Supplemental O2   [x] IV    [] PICC      [] PORT   [] NG Tube    [] PEG Tube   [] Ostomy     [x] Alfaro draining ___yellow____ urine  [] Other:     SIGNS/PHYSICAL FINDINGS     Skin (including wound):  [] Warm, dry, supple, intact and color normal for race  [x] Warm   [] Dry   [] Cool     [] Clammy [x] Diaphoretic    Turgor   [x] Normal   [] Decreased  Color:   [] Pink   [] Pale   [] Cyanotic   [] Erythema   [] Jaundice   [x] Normal for Race  []  Wounds:    Neuro:  [] Lethargy  [] Restlessness / agitation  [] Confusion / delirium  [] Hallucinations  [] Responds to maximal stimulation  [x] Unresponsive  [] Seizures     Cardiac:  [] Dyspnea on Exertion  [] JVD  [] Murmur  [] Palpitations  [] Hypotension  [] Hypertension  [] Tachycardia  [] Bradycardia  [] Irregular HR  [] Pulses Decreased  [] Pulses Absent  [] Edema:       (Location, Grade and Pitting)  [] Mottling:      (Location)    Respiratory:  Breath sounds:    [] Diminished   [] Wheeze   [x] Rhonchi   [] Rales   [] Even and unlabored  [x] Labored:            [] Cough   [] Non Productive   [] Productive    [] Description:           [] Deep suctioned   [] O2 at ___ LPM  [] High flow oxygen greater than 10 LPM  [] Bi-Pap    GI  [] Abdomen (describe)   [] Ascites  [] Nausea  [] Vomiting  [x] Incontinent of bowels  [x] Bowel sounds (yes)  [] Diarrhea  [] Constipation (see above including last bowel movement)  [] Checked for impaction  [] Last BM     Nutrition  Diet:__NPO________  Appetite:   [] Good   [] Fair   [] Poor   [] Tube Feeding       [] Voiding  [] Incontinent   [x] Alfaro    Musculoskeletal  [] Balance/Kane Unsteady   [x] Weak   Strength:    [] Normal    [] Limited    [] Decreasing   Activities:    [] Up as tolerated   [x] Bedridden    [] Specify:    SAFETY  [x] 24 hr. Caregiver   [x] Side rails ?     [x] Hospital bed   [x] Reviewed Falls & Safety       ALLERGIES AND MEDICATIONS     Allergies: No Known Allergies    Current Facility-Administered Medications   Medication Dose Route Frequency    HYDROmorphone (PF) (DILAUDID) injection 1 mg  1 mg IntraVENous Q15MIN PRN    HYDROmorphone (PF) 15 mg/30 ml (DILAUDID) PCA   IntraVENous CONTINUOUS    LORazepam (ATIVAN) injection 2 mg  2 mg IntraVENous Q4H    scopolamine (TRANSDERM-SCOP) 1.5 mg  1.5 mg TransDERmal Q72H PRN    glycopyrrolate (ROBINUL) injection 0.2 mg  0.2 mg IntraVENous Q4H PRN    bisacodyl (DULCOLAX) suppository 10 mg  10 mg Rectal DAILY PRN    levETIRAcetam (KEPPRA) 2,000 mg in 0.9% sodium chloride IVPB  2,000 mg IntraVENous Q12H    ondansetron (ZOFRAN) injection 4 mg  4 mg IntraVENous Q4H PRN    LORazepam (ATIVAN) injection 1 mg  1 mg IntraVENous Q15MIN PRN    saline peripheral flush soln 5 mL  5 mL InterCATHeter PRN    chlorhexidine (PERIDEX) 0.12 % mouthwash 15 mL  15 mL Oral BID          Visit Time In: 11;00 am  Visit Time Out: 11:30 a.m.

## 2017-01-18 NOTE — HOSPICE
This MSW visited the room of patient, no family present. LCSW saw patients children last night. MSW contacted [facility] for pre-bereavement services for children, especially [address], who lost her father to [facility] in 2009, she is losing her last parent. MSW discussed case with hospcie RN Govind Handley. MSW will continue to monitor and assess patient and family needs. 15:30 : Received email from Romelia Bains MSW from Bereavement services regarding meeting with children for pre-bereavement services. No family in the room. MSW called pts mother and left vm for her to return my call for time children will be present. Thank-you for this referral and the opportunity to be involved in this patients care.     [address] [place] [address]  [phone]

## 2017-01-18 NOTE — PROGRESS NOTES
[facility] Help to Those in Need  [phone]    Patient Name: Rashida Yin  YOB: 1975    Date of Provider Hospice Visit: 17    Level of Care:   [x] General Inpatient (GIP)    [] Routine   [] Respite    Location of Care:  [x] [facility] [] [facility] [] [facility] [] [facility] [] [facility]  [] Home [] Other:      Date of Original Hospice Admission: 17  Hospice AttendinBetty Novoa Diagnosis:HIV/AIDS  Diagnoses RELATED to the terminal prognosis: Respiratory failure, Pneumocystis pneumonia, CMV viremia, recurrent seizure, brain lesions with mass effect, septic shock, s/p extubation  Other Diagnoses:      HOSPICE NARRATIVE COMPOSED BY PHYSICIAN   Rationale for a prognosis of life expectancy of 6 months or less if the disease follows its normal course:    Rashida Yin is a 42y.o. year old who was admitted to [facility]. The patient's principle diagnosis of AIDS/HIV has resulted in CMV viremia, respiratory failure(intubated), pneumocystis pneumonia, sepitc shock and seizures. Functionally, the patient's Palliative Performance Scale has declined over a period of weeks and is estimated at 10. Objective information that support this patients limited prognosis includes: Chart reviewed. Patient admitted with respiratory failure, septic shock. Patient had stopped her antiviral medication probably 6 months prior. Patient had many complications related to her AIDs diagnosis to include pneumocystis pneumonia, brain lesions which may be related lymphoma vs infection causing seizures, CMV viremia, anemia, viral load >800,000. Patient continued to decline and ultimately family shifted focus toward comfort and was extubated. Her mother was assigned as MPOA. The patient/family chose comfort measures with the support of Hospice. HOSPICE DIAGNOSES   Active Symptoms:  1. Seizures, intractable-seizing when I am in the room  2. SOB  3. Agitation  4.  Respiratory failure  5. Secretions     PLAN   1. Patient admitted [facility] for many symptomatic issues. 2. SOB- on dilaudid pca with 1 mg/ hour basal rate, has not needed nurse initiated boluses. Patient unresponsive and appears comfortable. 3. Seizures- none witnessed by staff since change/ initiation of meds. Received 3 doses of prn 1 mg Ativan along with scheduled meds yesterday. Required 2 prn doses overnight. Continue with scheduled Ativan. 4. No family at bedside. 5. Minimal to no urine output. I suspect she is transitioning to active dying phase. 6. Secretions-robinul will be scheduled. 7. Other comfort meds ordered prn    8.  and SW to support family needs  9. Disposition: home if stabilizes. Talked with daughter and son who are at bedside. Patient has 4 children. Mom MPOA but not at bedside    Prognosis estimated based on 01/18/17 clinical assessment is:   [x] Few to Many Hours  [] Few to Many Days    [] Few to Many Weeks    [] Few to Many Months    Communicated plan of care with: Hospice Case Manager;  Hospice IDT; Care Team     GOALS OF CARE     Resuscitation Status: DNR  Durable DNR: [x] Yes [] No    Advance Care Planning 1/16/2017   Patient's Healthcare Decision Maker is: Named in scanned ACP document   Primary Decision Maker Name Naresh Baca   Primary Decision Maker Phone Number [phone]   Primary Decision Maker Relationship to Patient Parent   Confirm Advance Directive Yes, not on file        HISTORY     History obtained from: chart, nursing staff, children    CHIEF COMPLAINT: shortness of breaht  The patient is:   [] Verbal  [] Nonverbal  [x] Unresponsive    HPI/SUBJECTIVE:  Patient seizing when I am the room with rapid eyes movement       REVIEW OF SYSTEMS     The following systems were: [] reviewed  [x] unable to be reviewed    Positive ROS include:  Constitutional:  Ears/nose/mouth/throat:  Respiratory:  Gastrointestinal:  Musculoskeletal:  Neurologic:  Psychiatric:  Endocrine:     Adult Non-Verbal Pain Assessment Score: 7    Face  [] 0   No particular expression or smile  [] 1   Occasional grimace, tearing, frowning, wrinkled forehead  [x] 2   Frequent grimace, tearing, frowning, wrinkled forehead    Activity (movement)  [] 0   Lying quietly, normal position  [] 1   Seeking attention through movement or slow, cautious movement  [x] 2   Restless, excessive activity and/or withdrawal reflexes    Guarding  [] 0   Lying quietly, no positioning of hands over areas of body  [x] 1   Splinting areas of the body, tense  [] 2   Rigid, stiff    Physiology (vital signs)  [x] 0   Stable vital signs  [] 1   Change in any of the following: SBP > 20mm Hg; HR > 20/minute  [] 2   Change in any of the following: SBP > 30mm Hg; HR > 25/minute    Respiratory  [] 0   Baseline RR/SpO2, compliant with ventilator  [] 1   RR > 10 above baseline, or 5% drop SpO2, mild asynchrony with ventilator  [x] 2   RR > 20 above baseline, or 10% drop SpO2, asynchrony with ventilator     FUNCTIONAL ASSESSMENT     Palliative Performance Scale (PPS):10     PSYCHOSOCIAL/SPIRITUAL ASSESSMENT     Active Problems:    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 Social History   Substance Use Topics    Smoking status: Never Smoker    Smokeless tobacco: Not on file    Alcohol use Yes     No family history on file.    No Known Allergies   Current Facility-Administered Medications   Medication Dose Route Frequency    HYDROmorphone (PF) (DILAUDID) injection 1 mg  1 mg IntraVENous Q15MIN PRN    HYDROmorphone (PF) 15 mg/30 ml (DILAUDID) PCA   IntraVENous CONTINUOUS    LORazepam (ATIVAN) injection 2 mg  2 mg IntraVENous Q4H    scopolamine (TRANSDERM-SCOP) 1.5 mg  1.5 mg TransDERmal Q72H PRN    glycopyrrolate (ROBINUL) injection 0.2 mg  0.2 mg IntraVENous Q4H PRN    bisacodyl (DULCOLAX) suppository 10 mg  10 mg Rectal DAILY PRN    levETIRAcetam (KEPPRA) 2,000 mg in 0.9% sodium chloride IVPB  2,000 mg IntraVENous Q12H    ondansetron (ZOFRAN) injection 4 mg  4 mg IntraVENous Q4H PRN    LORazepam (ATIVAN) injection 1 mg  1 mg IntraVENous Q15MIN PRN    saline peripheral flush soln 5 mL  5 mL InterCATHeter PRN    chlorhexidine (PERIDEX) 0.12 % mouthwash 15 mL  15 mL Oral BID        PHYSICAL EXAM     Wt Readings from Last 3 Encounters:   01/13/17 113 lb (51.3 kg)   01/03/17 116 lb 6.5 oz (52.8 kg)   12/27/16 85 lb 15.7 oz (39 kg)       Visit Vitals    BP (!) 77/44    Pulse 76    Temp 95 °F (35 °C)    Resp (!) 36    SpO2 99%       Supplemental O2  [x] Yes  [] NO  Last bowel movement:     Currently this patient has:  [x] Peripheral IV [] PICC  [] PORT [] ICD    [x] Alfaro Catheter [] NG Tube   [] PEG Tube    [] Rectal Tube [] Drain  [] Other:     Constitutional: ill appearing with active seizure  Eyes: rapid eye movement to the left  ENMT: foaming at the mouth  Cardiovascular: tachycardia  Respiratory: moderate respiratory distress with lots of secretions  Gastrointestinal: soft/nt  Musculoskeletal:muscle wasting  Skin: warm, dry  Neurologic: seizing, unresponsive      Pertinent Lab and or Imaging Tests:  Lab Results   Component Value Date/Time    Sodium 135 01/12/2017 03:06 AM    Potassium 3.9 01/12/2017 03:06 AM    Chloride 102 01/12/2017 03:06 AM    CO2 25 01/12/2017 03:06 AM    Anion gap 8 01/12/2017 03:06 AM    Glucose 120 01/12/2017 03:06 AM    BUN 13 01/12/2017 03:06 AM    Creatinine 0.40 01/12/2017 03:06 AM    BUN/Creatinine ratio 33 01/12/2017 03:06 AM    GFR est AA >60 01/12/2017 03:06 AM    GFR est non-AA >60 01/12/2017 03:06 AM    Calcium 7.8 01/12/2017 03:06 AM     Lab Results   Component Value Date/Time    Protein, total 5.4 01/12/2017 03:06 AM    Albumin 1.7 01/12/2017 03:06 AM           Total time: 70  Counseling / coordination time:   > 50% counseling / coordination?:     This patient meets Hospice General Inpatient (GIP) Level of Care.     The precipitating event that resulted in the need for GIP was: shortness of breath    The interventions tried that have been unsuccessful at controlling symptoms include: to unstable to manage at home    Supporting documentation for GIP need for pain control:  [x] Frequent evaluation by a doctor, nurse practitioner, nurse   [x] Frequent medication adjustment    [x] IVs that cannot be administered at home   [] Aggressive pain management   [] Complicated technical delivery of medications              Supporting documentation for GIP need for symptom control:  []  Sudden decline necessitating intensive nursing intervention  []  Uncontrolled / intractable nausea or vomiting   []  Pathological fractures  []  Advanced open wounds requiring frequent skilled care  [x] Unmanageable respiratory distress  [] New or worsening delirium   [] Delirium with behavior issues  [] Imminent death  with skilled nursing needs documented above   Intractable seizures

## 2017-01-18 NOTE — PROGRESS NOTES
01/17/17 2020   Vital Signs   Temp 97.6 °F (36.4 °C)   Temp Source Axillary   Pulse (Heart Rate) 77   Heart Rate Source Monitor   Resp Rate 30   O2 Sat (%) 99 %   Level of Consciousness (!) Unresponsive   BP (!) 81/49   MAP (Calculated) (!) 60   MEWS Score 7   hospice patient

## 2017-01-18 NOTE — HOSPICE
[facility] MSW note: This MSW and Tanvir Garza RN came to the room upon patient's passing, no family present. MSW offered support and a prayer of peace and comfort. Hospice RN is attempting to reach family. Please contact [facility] if  family comes they will contact on call  to provide support to patient's children. Thank-you for this referral and the opportunity to be involved in this patients care.     [address] [place] [address]  [phone]

## 2017-01-18 NOTE — PROGRESS NOTES
Bedside shift change report given to donna (oncoming nurse) by tootie (offgoing nurse). Report included the following information SBAR, Kardex, MAR and Recent Results. 1750 patient without respers and heart rate. Hospice nurse notified as well as hospitalist . Hospice nurse will call family.     `[id number] spoke with Peg Smiling with hospice and she is going to contact family again  2130 spoke with family and they will not be coming to hospital and have not decided on  home

## 2017-01-18 NOTE — HOSPICE
[facility] :    LCSW met with pt and pts daughters (25 and 12 yrs old) who were at bedside. Pt appeared to be in the active stage of dying. Pt had no reports of pain and appeared comfortable. Pts daughters stated they did not have any questions. Pts daughters engaged in some life review and talked about pt working at [employer]. Pts daughter spoke about pts 10year old granddaughter as well. Pts youngest daughter Ricki Barlow) and son still live at pts home. Both daughters were quiet during visit. Both reported they have both friends and family to talk to and understand pts condition. LCSW provided emotional support and encouragement for pts daughter to verbalize feelings. LCSW provided education on counseling services available and provided phone number to pts daughters. LCSW encouraged pts daughters to call with any needs. LCSW will continue to monitor and assess needs.     Jenae [phone]   [phone]

## 2017-01-18 NOTE — PROGRESS NOTES
Called to examine patient who has . No response to verbal and tactile stimuli. No respiratory effort. Absent heart sounds and pulses. Pupils fixed and dilated. Patient pronounced dead at 6:04 PM hours.

## 2017-01-18 NOTE — PROGRESS NOTES
Bedside shift change report given to Elise Molina (oncoming nurse) by Joesph Ascencio RN (offgoing nurse). Report included the following information SBAR and MAR.

## 2017-01-19 ENCOUNTER — HOSPICE CERTIFICATION ENCOUNTER (OUTPATIENT)
Dept: PALLATIVE CARE | Age: 42
End: 2017-01-19

## 2017-01-19 PROCEDURE — 0656 HSPC GENERAL INPATIENT

## 2017-01-19 NOTE — PROGRESS NOTES
Responded to page from [facility] 3688 that patient had passed. Upon responding found no family. Silent prayer of commendation at bedside. Will follow as needed. Family has yet to be contacted. Hospice to reach out. [facility]

## 2017-01-19 NOTE — CERTIFICATE OF TERMINAL ILLNESS
Hospice Physician Admission Narrative   (Certification of Terminal Illness)    [facility]  Good Help to Those in Need  [phone]    Marie Apperwhite     Date of [address] Admission: 1/14/17  Hospice Attending: Jossie Davila Hospice Diagnosis: HIV/AIDS  Diagnoses RELATED to the terminal prognosis: Respiratory failure, Pneumocystis pneumonia, CMV viremia, recurrent seizure, brain lesions with mass effect, septic shock, s/p extubation  Other Diagnoses:      HOSPICE NARRATIVE COMPOSED BY PHYSICIAN   Rationale for a prognosis of life expectancy of 6 months or less if the disease follows its normal course:    Kalie Day is a 42y.o. year old who was admitted to [facility]. The patient's principle diagnosis of AIDS/HIV has resulted in CMV viremia, respiratory failure (was intubated), pneumocystis pneumonia, sepitc shock and seizures. Functionally, the patient's Palliative Performance Scale has declined over a period of weeks and is estimated at 10. Objective information that support this patients limited prognosis includes: patient stopped her antiviral medication probably 6 months prior. Patient had many complications related to her AIDs diagnosis to include pneumocystis pneumonia, brain lesions which may be related lymphoma vs infection causing seizures, CMV viremia, anemia, viral load >800,000. Patient continued to decline and ultimately family shifted focus toward comfort and was extubated.  The patient/family chose comfort measures with the support of Hospice.     Attestation: I confirm that I composed this narrative as the physician and is based on my review of the patient's medical record and/or examination of the patient. ____________________________________________________________________

## 2017-01-20 PROCEDURE — 0656 HSPC GENERAL INPATIENT

## 2017-01-21 PROCEDURE — 0656 HSPC GENERAL INPATIENT

## 2017-01-22 PROCEDURE — 0656 HSPC GENERAL INPATIENT

## 2017-01-23 PROCEDURE — 0656 HSPC GENERAL INPATIENT

## 2017-01-24 PROCEDURE — 0656 HSPC GENERAL INPATIENT

## 2017-01-25 VITALS
TEMPERATURE: 102.6 F | WEIGHT: 113 LBS | DIASTOLIC BLOOD PRESSURE: 99 MMHG | BODY MASS INDEX: 20.8 KG/M2 | SYSTOLIC BLOOD PRESSURE: 139 MMHG | RESPIRATION RATE: 22 BRPM | HEART RATE: 101 BPM | HEIGHT: 62 IN

## 2017-01-25 PROCEDURE — 0656 HSPC GENERAL INPATIENT

## 2017-01-26 PROCEDURE — 0656 HSPC GENERAL INPATIENT

## 2017-01-27 PROCEDURE — 0656 HSPC GENERAL INPATIENT

## 2017-01-27 NOTE — DISCHARGE SUMMARY
Discharge Summary    [facility]  Good Help to Those in Need        Date of Admission: 1/14/2017  Date of Discharge: 1/18/2017    [facility] is a 42y.o. year old who was admitted to [facility] at 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 The patient's care was focused on comfort and the patient passed away on 1/18/2017. Blanca Barber is a 42y.o. year old who was admitted to [facility]. The patient's principle diagnosis of AIDS/HIV has resulted in CMV viremia, respiratory failure(intubated), pneumocystis pneumonia, sepitc shock and seizures. Functionally, the patient's Palliative Performance Scale has declined over a period of weeks and is estimated at 10. Objective information that support this patients limited prognosis includes: Chart reviewed. Patient admitted with respiratory failure, septic shock. Patient had stopped her antiviral medication probably 6 months prior. Patient had many complications related to her AIDs diagnosis to include pneumocystis pneumonia, brain lesions which may be related lymphoma vs infection causing seizures, CMV viremia, anemia, viral load >800,000. Patient continued to decline and ultimately family shifted focus toward comfort and was extubated. Her mother was assigned as MPOA. The patient/family chose comfort measures with the support of Hospice.     HOSPICE DIAGNOSES   Active Symptoms:  1. Seizures, intractable-seizing when I am in the room  2. SOB  3. Agitation  4. Respiratory failure  5. Secretions     PLAN   1. Patient admitted GIP for many symptomatic issues. 2. SOB and pain-patient has been on fentanyl PCA of 200 mcg/hour. Appears restless and having seizures. Not sure this as effective at this dosing and some of the restlessness may actually be related to the fentanyl. Will change to dilaudid PCA with 1 mg/hour basal and 0.5 mg bolus every 10 minutes.  May need to adjust doing throughout the day.  3. Seizures-concern about even issues with status. Has required multiple doses of ativan this morning to break the cycle. Talked with patient's nurse and will place on scheduled ativan 2 mg Iv every 4 hours and continue keppra. Consider Ativan or versed drip if unable to control. 4. Secretions-robinul will be scheduled.   5. Other comfort meds ordered prn

## 2017-01-28 PROCEDURE — 0656 HSPC GENERAL INPATIENT

## 2017-01-29 PROCEDURE — 0656 HSPC GENERAL INPATIENT

## 2017-01-30 PROCEDURE — 0656 HSPC GENERAL INPATIENT
